# Patient Record
Sex: FEMALE | Race: WHITE | Employment: UNEMPLOYED | URBAN - METROPOLITAN AREA
[De-identification: names, ages, dates, MRNs, and addresses within clinical notes are randomized per-mention and may not be internally consistent; named-entity substitution may affect disease eponyms.]

---

## 2017-06-29 ENCOUNTER — HOSPITAL ENCOUNTER (OUTPATIENT)
Age: 57
Setting detail: OUTPATIENT SURGERY
Discharge: HOME | End: 2017-06-29
Attending: SPECIALIST | Admitting: SPECIALIST
Payer: MEDICARE

## 2017-06-29 ENCOUNTER — ANESTHESIA EVENT (OUTPATIENT)
Dept: ENDOSCOPY | Age: 57
End: 2017-06-29
Payer: MEDICARE

## 2017-06-29 ENCOUNTER — ANESTHESIA (OUTPATIENT)
Dept: ENDOSCOPY | Age: 57
End: 2017-06-29
Payer: MEDICARE

## 2017-06-29 VITALS
RESPIRATION RATE: 18 BRPM | OXYGEN SATURATION: 99 % | SYSTOLIC BLOOD PRESSURE: 125 MMHG | BODY MASS INDEX: 27.83 KG/M2 | DIASTOLIC BLOOD PRESSURE: 73 MMHG | HEIGHT: 70 IN | HEART RATE: 72 BPM | TEMPERATURE: 97.8 F | WEIGHT: 194.38 LBS

## 2017-06-29 PROCEDURE — 74011000250 HC RX REV CODE- 250

## 2017-06-29 PROCEDURE — 76040000019: Performed by: SPECIALIST

## 2017-06-29 PROCEDURE — 76060000031 HC ANESTHESIA FIRST 0.5 HR: Performed by: SPECIALIST

## 2017-06-29 PROCEDURE — 77030009426 HC FCPS BIOP ENDOSC BSC -B: Performed by: SPECIALIST

## 2017-06-29 PROCEDURE — 88305 TISSUE EXAM BY PATHOLOGIST: CPT | Performed by: SPECIALIST

## 2017-06-29 PROCEDURE — 74011250636 HC RX REV CODE- 250/636

## 2017-06-29 RX ORDER — MIDAZOLAM HYDROCHLORIDE 1 MG/ML
.25-1 INJECTION, SOLUTION INTRAMUSCULAR; INTRAVENOUS
Status: DISCONTINUED | OUTPATIENT
Start: 2017-06-29 | End: 2017-06-29 | Stop reason: HOSPADM

## 2017-06-29 RX ORDER — ATROPINE SULFATE 0.1 MG/ML
0.5 INJECTION INTRAVENOUS
Status: DISCONTINUED | OUTPATIENT
Start: 2017-06-29 | End: 2017-06-29 | Stop reason: HOSPADM

## 2017-06-29 RX ORDER — SODIUM CHLORIDE 0.9 % (FLUSH) 0.9 %
5-10 SYRINGE (ML) INJECTION EVERY 8 HOURS
Status: DISCONTINUED | OUTPATIENT
Start: 2017-06-29 | End: 2017-06-29 | Stop reason: HOSPADM

## 2017-06-29 RX ORDER — NALOXONE HYDROCHLORIDE 0.4 MG/ML
0.4 INJECTION, SOLUTION INTRAMUSCULAR; INTRAVENOUS; SUBCUTANEOUS
Status: DISCONTINUED | OUTPATIENT
Start: 2017-06-29 | End: 2017-06-29 | Stop reason: HOSPADM

## 2017-06-29 RX ORDER — LIDOCAINE HYDROCHLORIDE 20 MG/ML
INJECTION, SOLUTION EPIDURAL; INFILTRATION; INTRACAUDAL; PERINEURAL AS NEEDED
Status: DISCONTINUED | OUTPATIENT
Start: 2017-06-29 | End: 2017-06-29 | Stop reason: HOSPADM

## 2017-06-29 RX ORDER — SODIUM CHLORIDE 9 MG/ML
INJECTION, SOLUTION INTRAVENOUS
Status: DISCONTINUED | OUTPATIENT
Start: 2017-06-29 | End: 2017-06-29 | Stop reason: HOSPADM

## 2017-06-29 RX ORDER — FENTANYL CITRATE 50 UG/ML
200 INJECTION, SOLUTION INTRAMUSCULAR; INTRAVENOUS
Status: DISCONTINUED | OUTPATIENT
Start: 2017-06-29 | End: 2017-06-29 | Stop reason: HOSPADM

## 2017-06-29 RX ORDER — SODIUM CHLORIDE 0.9 % (FLUSH) 0.9 %
5-10 SYRINGE (ML) INJECTION AS NEEDED
Status: DISCONTINUED | OUTPATIENT
Start: 2017-06-29 | End: 2017-06-29 | Stop reason: HOSPADM

## 2017-06-29 RX ORDER — FLUMAZENIL 0.1 MG/ML
0.2 INJECTION INTRAVENOUS
Status: DISCONTINUED | OUTPATIENT
Start: 2017-06-29 | End: 2017-06-29 | Stop reason: HOSPADM

## 2017-06-29 RX ORDER — EPINEPHRINE 0.1 MG/ML
1 INJECTION INTRACARDIAC; INTRAVENOUS
Status: DISCONTINUED | OUTPATIENT
Start: 2017-06-29 | End: 2017-06-29 | Stop reason: HOSPADM

## 2017-06-29 RX ORDER — SODIUM CHLORIDE 9 MG/ML
50 INJECTION, SOLUTION INTRAVENOUS CONTINUOUS
Status: DISCONTINUED | OUTPATIENT
Start: 2017-06-29 | End: 2017-06-29 | Stop reason: HOSPADM

## 2017-06-29 RX ORDER — DEXTROMETHORPHAN/PSEUDOEPHED 2.5-7.5/.8
1.2 DROPS ORAL
Status: DISCONTINUED | OUTPATIENT
Start: 2017-06-29 | End: 2017-06-29 | Stop reason: HOSPADM

## 2017-06-29 RX ORDER — PROPOFOL 10 MG/ML
INJECTION, EMULSION INTRAVENOUS AS NEEDED
Status: DISCONTINUED | OUTPATIENT
Start: 2017-06-29 | End: 2017-06-29 | Stop reason: HOSPADM

## 2017-06-29 RX ADMIN — PROPOFOL 50 MG: 10 INJECTION, EMULSION INTRAVENOUS at 15:26

## 2017-06-29 RX ADMIN — PROPOFOL 50 MG: 10 INJECTION, EMULSION INTRAVENOUS at 15:23

## 2017-06-29 RX ADMIN — PROPOFOL 50 MG: 10 INJECTION, EMULSION INTRAVENOUS at 15:20

## 2017-06-29 RX ADMIN — SODIUM CHLORIDE: 9 INJECTION, SOLUTION INTRAVENOUS at 15:15

## 2017-06-29 RX ADMIN — PROPOFOL 50 MG: 10 INJECTION, EMULSION INTRAVENOUS at 15:28

## 2017-06-29 RX ADMIN — PROPOFOL 100 MG: 10 INJECTION, EMULSION INTRAVENOUS at 15:19

## 2017-06-29 RX ADMIN — LIDOCAINE HYDROCHLORIDE 40 MG: 20 INJECTION, SOLUTION EPIDURAL; INFILTRATION; INTRACAUDAL; PERINEURAL at 15:19

## 2017-06-29 NOTE — IP AVS SNAPSHOT
[address] 
[phone] Patient: Everardo Gooden 
MRN: [id number] RRH:0/2/1562 You are allergic to the following Allergen Reactions Naproxen Other (comments) Abilify (Aripiprazole) Unknown (comments) Cymbalta (Duloxetine) Other (comments) Hypotension Lexapro (Escitalopram) Not Reported This Time Mercury (Bulk) Rash Morphine Rash Risperidone Other (comments)  
 seizures Recent Documentation Height Weight Breastfeeding? BMI OB Status Smoking Status 1.778 m 88.2 kg No 27.89 kg/m2 Hysterectomy Light Tobacco Smoker Emergency Contacts Name Discharge Info Relation Home Work Mobile Deacon Lang  Spouse [3] [phone]    
 NShamikaDeacon  Spouse [3] [phone] About your hospitalization You were admitted on:  June 29, 2017 You last received care in the:  [facility] ENDOSCOPY You were discharged on:  June 29, 2017 Unit phone number:  [phone] Why you were hospitalized Your primary diagnosis was:  Not on File Providers Seen During Your Hospitalizations Provider Role Specialty Primary office phone Lori Simental MD Attending Provider Gastroenterology [phone] Your Primary Care Physician (PCP) Primary Care Physician Office Phone Office Fax Carroll Mello [phone] Follow-up Information None Current Discharge Medication List  
  
CONTINUE these medications which have NOT CHANGED Dose & Instructions Dispensing Information Comments Morning Noon Evening Bedtime AMPHETAMINE SALT COMBO 20 mg tablet Generic drug:  dextroamphetamine-amphetamine Your last dose was: Your next dose is:    
   
   
 Dose:  20 mg Take 20 mg by mouth two (2) times a day. Refills:  0  
     
   
   
   
  
 ergocalciferol 50,000 unit capsule Commonly known as:  VITAMIN D2 Your last dose was: Your next dose is:    
   
   
 Dose:  49082 Units Take 1 Cap by mouth every seven (7) days. Indications: VITAMIN D DEFICIENCY Quantity:  12 Cap Refills:  1 Discharge Instructions Shayan Voss 
[id number] 
1960 EGD DISCHARGE INSTRUCTIONS Discomfort: 
Sore throat- throat lozenges or warm salt water gargle 
redness at IV site- apply warm compress to area; if redness or soreness persist- contact your physician Gaseous discomfort- walking, belching will help relieve any discomfort You may not operate a vehicle for 12 hours You may not engage in an occupation involving machinery or appliances for rest of today. You may not drink alcoholic beverages for at least 12 hours Avoid making any critical decisions for at least 24 hour DIET You may resume your regular diet  however -  remember your colon is empty and a heavy meal will produce gas. Avoid these foods:  vegetables, fried / greasy foods, carbonated drinks MEDICATIONS Regarding Aspirin or Nonsteroidal medications specifically, please see below. ACTIVITY You may resume your normal daily activities. Spend the remainder of the day resting -  avoid any strenuous activity. CALL M.D. ANY SIGN OF Increasing pain, nausea, vomiting Abdominal distension (swelling) New increased bleeding (oral or rectal) Fever (chills) Pain in chest area Bloody discharge from nose or mouth Shortness of breath You may not  take any Advil, Aspirin, Ibuprofen, Motrin, Aleve, or Goodys for 10 days, ONLY  Tylenol as needed for pain. Follow-up Instructions: 
 Call Dr. Deepti Mukherjee Results of procedure / biopsy in 10 days, call my office to set up colon appointment. Telephone #  [phone] DISCHARGE SUMMARY from Nurse The following personal items collected during your admission are returned to you: Dental Appliance: Dental Appliances: Uppers, At bedside Vision: Visual Aid: None Hearing Aid:   
Jewelry:   
Clothing:   
Other Valuables:   
Valuables sent to safe:   
 
 
 
 
  
 
 
Discharge Orders None Introducing [facility]! Dear Yordy Aleman: Thank you for requesting a [employer] account. Our records indicate that you have previously registered for a [employer] account but its currently inactive. Please call our [employer] support line at [phone]. Additional Information If you have questions, please visit the Frequently Asked Questions section of the K[employer] website at [email or web address] Remember, [employer] is NOT to be used for urgent needs. For medical emergencies, dial 911. Now available from your iPhone and Android! General Information Please provide this summary of care documentation to your next provider. Patient Signature:  ____________________________________________________________ Date:  ____________________________________________________________  
  
Yordy Aleman Provider Signature:  ____________________________________________________________ Date:  ____________________________________________________________

## 2017-06-29 NOTE — PROGRESS NOTES

## 2017-06-29 NOTE — PROCEDURES
[address]  [address]                 NAME:  Leatha Davila   :   1960   MRN:   [id number]     Date/Time:  2017 3:39 PM    Esophagogastroduodenoscopy (EGD) Procedure Note    :  Marissa Guzmán MD    Referring Provider:  Adam Kingsley NP    Anethesia/Sedation:  MAC anesthesia Propofol    Preoperative diagnosis: ABDOMINAL PAIN, ANEMIA, BIPOLAR DISORDER    Postoperative diagnosis: 1.- Normal EGD    Procedure Details     After infom consent was obtained for the procedure, with all risks and benefits of procedure explained the patient was taken to the endoscopy suite and placed in the left lateral decubitus position. Following sequential administration of sedation as per above, the [id number] gastroscope was inserted into the mouth and advanced under direct vision to proximal jejunum. A careful inspection was made as the gastroscope was withdrawn, including a retroflexed view of the proximal stomach; findings and interventions are described below. Findings:  Esophagus:normal  Stomach:normal   Duodenum/jejunum:normal jejunal mucosa, biopsies done. Therapies:  none    Specimens: jejunal biopsy           EBL: None    Complications:   None; patient tolerated the procedure well.            Impression:    See Postoperative diagnosis above    Recommendations:  -Colon appointment    Discharge disposition:  Home in the company of  when able to ambulate    Marissa Guzmán MD

## 2017-06-29 NOTE — ROUTINE PROCESS
Leatha [facility]  1960  [id number]    Situation:  Verbal report received from: Unique Paredes RN  Procedure: Procedure(s):  ESOPHAGOGASTRODUODENOSCOPY (EGD)  ESOPHAGOGASTRODUODENAL (EGD) BIOPSY    Background:    Preoperative diagnosis: ABDOMINAL PAIN, ANEMIA, BIPOLAR DISORDER  Postoperative diagnosis: 1.- Normal EGD    :  Dr. Juliano Meadows  Assistant(s): Endoscopy Technician-1: Monica Reese  Endoscopy RN-1: Linda Pastor RN    Specimens:   ID Type Source Tests Collected by Time Destination   1 : Jejunum Biopsy Preservative   Marissa Guzmán MD 6/29/2017 1527 Pathology     H. Pylori  no    Assessment:  Intra-procedure medications     Anesthesia gave intra-procedure sedation and medications, see anesthesia flow sheet yes    Intravenous fluids: NS@ KVO     Vital signs stable     Abdominal assessment: round and soft     Recommendation:  Discharge patient per MD order.     Family or Friend  Permission to share finding with family or friend yes

## 2017-06-29 NOTE — DISCHARGE INSTRUCTIONS
Giuliana Dallas  [id number]  1960    EGD DISCHARGE INSTRUCTIONS  Discomfort:  Sore throat- throat lozenges or warm salt water gargle  redness at IV site- apply warm compress to area; if redness or soreness persist- contact your physician  Gaseous discomfort- walking, belching will help relieve any discomfort  You may not operate a vehicle for 12 hours  You may not engage in an occupation involving machinery or appliances for rest of today. You may not drink alcoholic beverages for at least 12 hours  Avoid making any critical decisions for at least 24 hour  DIET  You may resume your regular diet - however -  remember your colon is empty and a heavy meal will produce gas. Avoid these foods:  vegetables, fried / greasy foods, carbonated drinks  MEDICATIONS   Regarding Aspirin or Nonsteroidal medications specifically, please see below. ACTIVITY  You may resume your normal daily activities. Spend the remainder of the day resting -  avoid any strenuous activity. CALL M.D. ANY SIGN OF   Increasing pain, nausea, vomiting  Abdominal distension (swelling)  New increased bleeding (oral or rectal)  Fever (chills)  Pain in chest area  Bloody discharge from nose or mouth  Shortness of breath    You may not  take any Advil, Aspirin, Ibuprofen, Motrin, Aleve, or Goodys for 10 days, ONLY  Tylenol as needed for pain. Follow-up Instructions:   Call Dr. Kevin Taylor  Results of procedure / biopsy in 10 days, call my office to set up colon appointment.   Telephone #  [phone]        DISCHARGE SUMMARY from Nurse    The following personal items collected during your admission are returned to you:   Dental Appliance: Dental Appliances: Uppers, At bedside  Vision: Visual Aid: None  Hearing Aid:    Jewelry:    Clothing:    Other Valuables:    Valuables sent to safe:

## 2017-06-29 NOTE — ANESTHESIA PREPROCEDURE EVALUATION
Anesthetic History   No history of anesthetic complications            Review of Systems / Medical History  Patient summary reviewed, nursing notes reviewed and pertinent labs reviewed    Pulmonary  Within defined limits                 Neuro/Psych         TIA     Cardiovascular  Within defined limits                     GI/Hepatic/Renal           Liver disease     Endo/Other      Hypothyroidism: well controlled       Other Findings              Physical Exam    Airway  Mallampati: II  TM Distance: > 6 cm  Neck ROM: normal range of motion   Mouth opening: Normal     Cardiovascular  Regular rate and rhythm,  S1 and S2 normal,  no murmur, click, rub, or gallop             Dental  No notable dental hx       Pulmonary  Breath sounds clear to auscultation               Abdominal  GI exam deferred       Other Findings            Anesthetic Plan    ASA: 2  Anesthesia type: MAC          Induction: Intravenous  Anesthetic plan and risks discussed with: Patient

## 2017-06-30 NOTE — ANESTHESIA POSTPROCEDURE EVALUATION
Post-Anesthesia Evaluation and Assessment    Patient: Stephen Cranker MRN: [id number]  SSN: [id number]    YOB: 1960  Age: 61 y.o. Sex: female       Cardiovascular Function/Vital Signs  Visit Vitals    /73    Pulse 72    Temp 36.6 °C (97.8 °F)    Resp 18    Ht 5' 10\" (1.778 m)    Wt 88.2 kg (194 lb 6 oz)    SpO2 99%    Breastfeeding No    BMI 27.89 kg/m2       Patient is status post MAC anesthesia for Procedure(s):  ESOPHAGOGASTRODUODENOSCOPY (EGD)  ESOPHAGOGASTRODUODENAL (EGD) BIOPSY. Nausea/Vomiting: None    Postoperative hydration reviewed and adequate. Pain:  Pain Scale 1: Numeric (0 - 10) (06/29/17 1554)  Pain Intensity 1: 0 (06/29/17 1554)   Managed    Neurological Status: At baseline    Mental Status and Level of Consciousness: Arousable    Pulmonary Status:   O2 Device: Room air (06/29/17 1554)   Adequate oxygenation and airway patent    Complications related to anesthesia: None    Post-anesthesia assessment completed.  No concerns    Signed By: Mady Mendez MD     June 30, 2017

## 2017-09-18 ENCOUNTER — HOSPITAL ENCOUNTER (INPATIENT)
Age: 57
LOS: 1 days | Discharge: HOME | DRG: 882 | End: 2017-09-19
Attending: EMERGENCY MEDICINE | Admitting: PSYCHIATRY & NEUROLOGY
Payer: MEDICARE

## 2017-09-18 DIAGNOSIS — F31.4 BIPOLAR AFFECTIVE DISORDER, DEPRESSED, SEVERE (HCC): Primary | ICD-10-CM

## 2017-09-18 PROBLEM — F32.A DEPRESSION: Status: ACTIVE | Noted: 2017-09-18

## 2017-09-18 LAB
ALBUMIN SERPL-MCNC: 3 G/DL (ref 3.5–5)
ALBUMIN/GLOB SERPL: 0.9 {RATIO} (ref 1.1–2.2)
ALP SERPL-CCNC: 155 U/L (ref 45–117)
ALT SERPL-CCNC: 221 U/L (ref 12–78)
AMPHET UR QL SCN: NEGATIVE
ANION GAP SERPL CALC-SCNC: 8 MMOL/L (ref 5–15)
APAP SERPL-MCNC: <2 UG/ML (ref 10–30)
APPEARANCE UR: CLEAR
AST SERPL-CCNC: 99 U/L (ref 15–37)
BARBITURATES UR QL SCN: NEGATIVE
BASOPHILS # BLD: 0 K/UL (ref 0–0.1)
BASOPHILS NFR BLD: 0 % (ref 0–1)
BENZODIAZ UR QL: NEGATIVE
BILIRUB SERPL-MCNC: 0.6 MG/DL (ref 0.2–1)
BILIRUB UR QL: NEGATIVE
BUN SERPL-MCNC: 17 MG/DL (ref 6–20)
BUN/CREAT SERPL: 22 (ref 12–20)
CALCIUM SERPL-MCNC: 7.9 MG/DL (ref 8.5–10.1)
CANNABINOIDS UR QL SCN: NEGATIVE
CHLORIDE SERPL-SCNC: 112 MMOL/L (ref 97–108)
CO2 SERPL-SCNC: 22 MMOL/L (ref 21–32)
COCAINE UR QL SCN: NEGATIVE
COLOR UR: NORMAL
CREAT SERPL-MCNC: 0.79 MG/DL (ref 0.55–1.02)
DIFFERENTIAL METHOD BLD: ABNORMAL
DRUG SCRN COMMENT,DRGCM: NORMAL
EOSINOPHIL # BLD: 0.1 K/UL (ref 0–0.4)
EOSINOPHIL NFR BLD: 1 % (ref 0–7)
ERYTHROCYTE [DISTWIDTH] IN BLOOD BY AUTOMATED COUNT: 18.8 % (ref 11.5–14.5)
ETHANOL SERPL-MCNC: <10 MG/DL
GLOBULIN SER CALC-MCNC: 3.2 G/DL (ref 2–4)
GLUCOSE SERPL-MCNC: 161 MG/DL (ref 65–100)
GLUCOSE UR STRIP.AUTO-MCNC: NEGATIVE MG/DL
HCT VFR BLD AUTO: 28.7 % (ref 35–47)
HGB BLD-MCNC: 8.2 G/DL (ref 11.5–16)
HGB UR QL STRIP: NEGATIVE
KETONES UR QL STRIP.AUTO: NEGATIVE MG/DL
LEUKOCYTE ESTERASE UR QL STRIP.AUTO: NEGATIVE
LYMPHOCYTES # BLD: 2.2 K/UL (ref 0.8–3.5)
LYMPHOCYTES NFR BLD: 33 % (ref 12–49)
MCH RBC QN AUTO: 21.3 PG (ref 26–34)
MCHC RBC AUTO-ENTMCNC: 28.6 G/DL (ref 30–36.5)
MCV RBC AUTO: 74.5 FL (ref 80–99)
METHADONE UR QL: NEGATIVE
MONOCYTES # BLD: 0.7 K/UL (ref 0–1)
MONOCYTES NFR BLD: 10 % (ref 5–13)
NEUTS SEG # BLD: 3.8 K/UL (ref 1.8–8)
NEUTS SEG NFR BLD: 56 % (ref 32–75)
NITRITE UR QL STRIP.AUTO: NEGATIVE
OPIATES UR QL: NEGATIVE
PCP UR QL: NEGATIVE
PH UR STRIP: 5.5 [PH] (ref 5–8)
PLATELET # BLD AUTO: 568 K/UL (ref 150–400)
PLATELET COMMENTS,PCOM: ABNORMAL
POTASSIUM SERPL-SCNC: 3.3 MMOL/L (ref 3.5–5.1)
PROT SERPL-MCNC: 6.2 G/DL (ref 6.4–8.2)
PROT UR STRIP-MCNC: NEGATIVE MG/DL
RBC # BLD AUTO: 3.85 M/UL (ref 3.8–5.2)
RBC MORPH BLD: ABNORMAL
SALICYLATES SERPL-MCNC: <1.7 MG/DL (ref 2.8–20)
SODIUM SERPL-SCNC: 142 MMOL/L (ref 136–145)
SP GR UR REFRACTOMETRY: 1.02 (ref 1–1.03)
TSH SERPL DL<=0.05 MIU/L-ACNC: 1.89 UIU/ML (ref 0.36–3.74)
UROBILINOGEN UR QL STRIP.AUTO: 1 EU/DL (ref 0.2–1)
WBC # BLD AUTO: 6.8 K/UL (ref 3.6–11)

## 2017-09-18 PROCEDURE — 80307 DRUG TEST PRSMV CHEM ANLYZR: CPT | Performed by: EMERGENCY MEDICINE

## 2017-09-18 PROCEDURE — 74011250637 HC RX REV CODE- 250/637: Performed by: PSYCHIATRY & NEUROLOGY

## 2017-09-18 PROCEDURE — 90791 PSYCH DIAGNOSTIC EVALUATION: CPT

## 2017-09-18 PROCEDURE — 84443 ASSAY THYROID STIM HORMONE: CPT | Performed by: PSYCHIATRY & NEUROLOGY

## 2017-09-18 PROCEDURE — 81003 URINALYSIS AUTO W/O SCOPE: CPT | Performed by: EMERGENCY MEDICINE

## 2017-09-18 PROCEDURE — 36415 COLL VENOUS BLD VENIPUNCTURE: CPT | Performed by: EMERGENCY MEDICINE

## 2017-09-18 PROCEDURE — 80053 COMPREHEN METABOLIC PANEL: CPT | Performed by: EMERGENCY MEDICINE

## 2017-09-18 PROCEDURE — 74011250637 HC RX REV CODE- 250/637: Performed by: EMERGENCY MEDICINE

## 2017-09-18 PROCEDURE — 65220000003 HC RM SEMIPRIVATE PSYCH

## 2017-09-18 PROCEDURE — 99283 EMERGENCY DEPT VISIT LOW MDM: CPT

## 2017-09-18 PROCEDURE — 85025 COMPLETE CBC W/AUTO DIFF WBC: CPT | Performed by: EMERGENCY MEDICINE

## 2017-09-18 RX ORDER — POTASSIUM CHLORIDE 750 MG/1
40 TABLET, FILM COATED, EXTENDED RELEASE ORAL
Status: COMPLETED | OUTPATIENT
Start: 2017-09-18 | End: 2017-09-18

## 2017-09-18 RX ORDER — ASENAPINE 10 MG/1
10 TABLET SUBLINGUAL EVERY EVENING
COMMUNITY

## 2017-09-18 RX ORDER — LORAZEPAM 1 MG/1
1 TABLET ORAL
Status: DISCONTINUED | OUTPATIENT
Start: 2017-09-18 | End: 2017-09-19 | Stop reason: HOSPADM

## 2017-09-18 RX ORDER — LORAZEPAM 1 MG/1
1-2 TABLET ORAL
COMMUNITY

## 2017-09-18 RX ORDER — DEXTROAMPHETAMINE SACCHARATE, AMPHETAMINE ASPARTATE, DEXTROAMPHETAMINE SULFATE AND AMPHETAMINE SULFATE 5; 5; 5; 5 MG/1; MG/1; MG/1; MG/1
10 TABLET ORAL
COMMUNITY
End: 2017-09-19

## 2017-09-18 RX ORDER — SWAB
1 SWAB, NON-MEDICATED MISCELLANEOUS 2 TIMES DAILY
COMMUNITY

## 2017-09-18 RX ORDER — BENZTROPINE MESYLATE 2 MG/1
2 TABLET ORAL
Status: DISCONTINUED | OUTPATIENT
Start: 2017-09-18 | End: 2017-09-19 | Stop reason: HOSPADM

## 2017-09-18 RX ORDER — VITAMIN E 268 MG
400 CAPSULE ORAL DAILY
COMMUNITY

## 2017-09-18 RX ORDER — ZOLPIDEM TARTRATE 5 MG/1
5 TABLET ORAL
Status: DISCONTINUED | OUTPATIENT
Start: 2017-09-18 | End: 2017-09-19 | Stop reason: HOSPADM

## 2017-09-18 RX ORDER — ZOLPIDEM TARTRATE 10 MG/1
10 TABLET ORAL
Status: DISCONTINUED | OUTPATIENT
Start: 2017-09-18 | End: 2017-09-18 | Stop reason: DRUGHIGH

## 2017-09-18 RX ORDER — ADHESIVE BANDAGE
30 BANDAGE TOPICAL DAILY PRN
Status: DISCONTINUED | OUTPATIENT
Start: 2017-09-18 | End: 2017-09-19 | Stop reason: HOSPADM

## 2017-09-18 RX ORDER — ERGOCALCIFEROL 1.25 MG/1
50000 CAPSULE ORAL
COMMUNITY

## 2017-09-18 RX ORDER — OLANZAPINE 5 MG/1
5 TABLET ORAL
Status: DISCONTINUED | OUTPATIENT
Start: 2017-09-18 | End: 2017-09-19 | Stop reason: HOSPADM

## 2017-09-18 RX ORDER — IBUPROFEN 200 MG
1 TABLET ORAL
Status: DISCONTINUED | OUTPATIENT
Start: 2017-09-18 | End: 2017-09-19 | Stop reason: HOSPADM

## 2017-09-18 RX ORDER — BENZTROPINE MESYLATE 1 MG/ML
2 INJECTION INTRAMUSCULAR; INTRAVENOUS
Status: DISCONTINUED | OUTPATIENT
Start: 2017-09-18 | End: 2017-09-19 | Stop reason: HOSPADM

## 2017-09-18 RX ORDER — DEXTROAMPHETAMINE SACCHARATE, AMPHETAMINE ASPARTATE, DEXTROAMPHETAMINE SULFATE AND AMPHETAMINE SULFATE 5; 5; 5; 5 MG/1; MG/1; MG/1; MG/1
20 TABLET ORAL DAILY
COMMUNITY
End: 2017-09-19

## 2017-09-18 RX ORDER — LORAZEPAM 2 MG/ML
2 INJECTION INTRAMUSCULAR
Status: DISCONTINUED | OUTPATIENT
Start: 2017-09-18 | End: 2017-09-19 | Stop reason: HOSPADM

## 2017-09-18 RX ORDER — CYANOCOBALAMIN 1000 UG/ML
1000 INJECTION, SOLUTION INTRAMUSCULAR; SUBCUTANEOUS
COMMUNITY

## 2017-09-18 RX ORDER — ACETAMINOPHEN 325 MG/1
650 TABLET ORAL
Status: DISCONTINUED | OUTPATIENT
Start: 2017-09-18 | End: 2017-09-19 | Stop reason: HOSPADM

## 2017-09-18 RX ADMIN — POTASSIUM CHLORIDE 40 MEQ: 750 TABLET, FILM COATED, EXTENDED RELEASE ORAL at 19:26

## 2017-09-18 RX ADMIN — ZOLPIDEM TARTRATE 5 MG: 5 TABLET ORAL at 22:04

## 2017-09-18 NOTE — PROGRESS NOTES
Admission Medication Reconciliation:    Information obtained from: Patient    Significant PMH/Disease States:   Past Medical History:   Diagnosis Date    Anemia NEC     Anxiety disorder     Depression     Hypothyroid     Liver disease     past liver failure    Lyme disease     Nneka ([facility] [address]     Other ill-defined conditions     tramatic brain injury    Psychiatric disorder     depression    Stroke ([facility] [address] 1990s    TIA       Chief Complaint for this Admission:    Chief Complaint   Patient presents with    Mental Health Problem         Allergies:  Naproxen; Abilify [aripiprazole]; Cymbalta [duloxetine]; Lexapro [escitalopram]; Mercury (bulk); Morphine; and Risperidone    Prior to Admission Medications:   Prior to Admission Medications   Prescriptions Last Dose Informant Patient Reported? Taking? IRON PS CPLX/ASCORBIC ACID (IRON PS COMPLEX-ASCORBIC ACID PO)   Yes Yes   Sig: Take 1 Tab by mouth daily. KRILL OIL PO   Yes Yes   Sig: Take 1 Cap by mouth daily. LORazepam (ATIVAN) 1 mg tablet   Yes Yes   Sig: Take 1-2 mg by mouth nightly. asenapine (SAPHRIS) 10 mg subl   Yes Yes   Sig: 10 mg by SubLINGual route every evening. cyanocobalamin (VITAMIN B12) 1,000 mcg/mL injection 2017  Yes Yes   Si,000 mcg by IntraMUSCular route Every Saturday. dextroamphetamine-amphetamine (ADDERALL) 20 mg tablet   Yes Yes   Sig: Take 20 mg by mouth daily. dextroamphetamine-amphetamine (ADDERALL) 20 mg tablet   Yes Yes   Sig: Take 10 mg by mouth daily (after lunch). @ 1600         ergocalciferol (ERGOCALCIFEROL) 50,000 unit capsule 2017  Yes Yes   Sig: Take 50,000 Units by mouth Every Friday. prenatal vit-iron fumarate-fa (PRENATAL PLUS WITH IRON) 28 mg iron- 800 mcg tab   Yes Yes   Sig: Take 1 Tab by mouth two (2) times a day. vitamin E (AQUA GEMS) 400 unit capsule   Yes Yes   Sig: Take 400 Units by mouth daily.       Facility-Administered Medications: None         Comments/Recommendations: Spoke with patient. She reports that she hasn't been taking her medications for over the last week or so. She says when she gets depressed that she often stops taking medications.   Changes made to previous list on file as follows:    -Updated dose of Adderall  -Changed admin day of vitamin D2 to Friday's  -Added all other medications as shown above    Donell Jean, ChrisD

## 2017-09-18 NOTE — ED TRIAGE NOTES
Pt. presents tearful with family complaining of depression and being suicidal.  Pt. Has SI but denies plan. \"I can't, I promised my daughter\". Pt. has spouse and daughter with her.

## 2017-09-18 NOTE — ED PROVIDER NOTES
HPI Comments: 61 y.o. female with past medical history significant for depression who presents from home via private vehicle with chief complaint of suicidal ideations, depression, and HA. Pt reports for the past few days she has had HA and trouble remembering things. Over this time she has had an increasingly depressed mood and then today she began to have thoughts of wanting to die. She reports that she would take an overdose of her medications to end her life. She also reports that she has not been sleeping well the past 4 nights. Pt denies homicidal ideations, auditory or visual hallucinations, fever, chills, vision changes, CP, SOB, nausea, vomiting, abdominal pain. There are no other acute medical concerns at this time. PCP: Ricarda Mallory NP  Note written by tabitha Herndon, as dictated by Clarisa Dyer MD 6:29 PM         The history is provided by the patient. Past Medical History:   Diagnosis Date    Anemia NEC     Anxiety disorder     Depression     Hypothyroid     Liver disease     past liver failure    Lyme disease     Nneka ([facility] [address]     Other ill-defined conditions     tramatic brain injury    Psychiatric disorder     depression    Stroke ([facility] [address] 1990s    TIA       Past Surgical History:   Procedure Laterality Date    HX GASTRIC BYPASS  96    By Dr Chelsy Pulido HX GYN      hysterectomy/    HX ORTHOPAEDIC      back         Family History:   Problem Relation Age of Onset    Heart Attack Mother     Suicide Brother     COPD Father     Diabetes Father     Hypertension Father        Social History     Social History    Marital status:      Spouse name: N/A    Number of children: N/A    Years of education: N/A     Occupational History    Not on file.      Social History Main Topics    Smoking status: Light Tobacco Smoker    Smokeless tobacco: Former User     Quit date: 2017    Alcohol use No    Drug use: Yes     Special: Marijuana Comment: used a week ago    Sexual activity: Yes     Partners: Male     Birth control/ protection: None     Other Topics Concern    Not on file     Social History Narrative         ALLERGIES: Naproxen; Abilify [aripiprazole]; Cymbalta [duloxetine]; Lexapro [escitalopram]; Mercury (bulk); Morphine; and Risperidone    Review of Systems   Constitutional: Negative for chills, diaphoresis and fever. HENT: Negative for congestion, postnasal drip, rhinorrhea and sore throat. Eyes: Negative for photophobia, discharge, redness and visual disturbance. Respiratory: Negative for cough, chest tightness, shortness of breath and wheezing. Cardiovascular: Negative for chest pain, palpitations and leg swelling. Gastrointestinal: Negative for abdominal distention, abdominal pain, blood in stool, constipation, diarrhea, nausea and vomiting. Genitourinary: Negative for difficulty urinating, dysuria, frequency, hematuria and urgency. Musculoskeletal: Negative for arthralgias, back pain, joint swelling and myalgias. Skin: Negative for color change and rash. Neurological: Positive for headaches. Negative for dizziness, speech difficulty, weakness, light-headedness and numbness. Psychiatric/Behavioral: Positive for sleep disturbance and suicidal ideas. Negative for confusion and hallucinations. The patient is not nervous/anxious. All other systems reviewed and are negative. Vitals:    09/18/17 1447   BP: 120/70   Pulse: 80   Resp: 18   Temp: 97.9 °F (36.6 °C)   SpO2: 97%   Weight: 91.8 kg (202 lb 6.4 oz)   Height: 5' 10\" (1.778 m)            Physical Exam   Constitutional: She is oriented to person, place, and time. She appears well-developed and well-nourished. No distress. HENT:   Head: Normocephalic and atraumatic.    Right Ear: External ear normal.   Left Ear: External ear normal.   Nose: Nose normal.   Mouth/Throat: Oropharynx is clear and moist.   Eyes: Conjunctivae and EOM are normal. Pupils are equal, round, and reactive to light. No scleral icterus. Neck: Normal range of motion. Neck supple. No JVD present. No tracheal deviation present. No thyromegaly present. Cardiovascular: Normal rate, regular rhythm and normal heart sounds. Exam reveals no gallop and no friction rub. No murmur heard. Pulmonary/Chest: Effort normal and breath sounds normal. No respiratory distress. She has no wheezes. She has no rales. She exhibits no tenderness. Abdominal: Soft. Bowel sounds are normal. She exhibits no distension and no mass. There is no tenderness. There is no rebound and no guarding. Musculoskeletal: Normal range of motion. She exhibits no edema or tenderness. Lymphadenopathy:     She has no cervical adenopathy. Neurological: She is alert and oriented to person, place, and time. She has normal strength. She displays no atrophy and no tremor. No cranial nerve deficit. She exhibits normal muscle tone. Coordination and gait normal.   Skin: Skin is warm and dry. No rash noted. She is not diaphoretic. No erythema. Psychiatric:   Flat affect, poor eye contact, does not engage. Nursing note and vitals reviewed. Note written by tabitha Israel, as dictated by Irma Narvaez MD 6:39 PM      MDM  Number of Diagnoses or Management Options  Bipolar affective disorder, depressed, severe [facility]):   Diagnosis management comments: DAVE  44-year-old female presenting to the emergency department for evaluation for mental health disorder. Plan of care will be behavioral health consultation and we'll treat accordingly. ED Course       Procedures    5:39 PM  Spoke with Rajiv Rendon counselor, who agrees to see and evaluate the patient. 6:42 PM  The patient is medically cleared to be admitted to the psychiatric unit. She is hypokalemic and will require potassium supplement in the form of Dorothea Dur 40 mEq daily for the next 5 days with a repeat potassium check.  In addition she is anemic and should have further evaluation for the cause of her anemia.

## 2017-09-18 NOTE — IP AVS SNAPSHOT
[address] [facility] 
[phone] Patient: Kay Winslow 
MRN: [id number] SOF:8/8/6240 Current Discharge Medication List  
  
CONTINUE these medications which have CHANGED Dose & Instructions Dispensing Information Comments Morning Noon Evening Bedtime  
 ergocalciferol 50,000 unit capsule Commonly known as:  ERGOCALCIFEROL What changed:  Another medication with the same name was removed. Continue taking this medication, and follow the directions you see here. Your last dose was: Your next dose is:    
   
   
 Dose:  57099 Units Take 50,000 Units by mouth Every Friday. Refills:  0 CONTINUE these medications which have NOT CHANGED Dose & Instructions Dispensing Information Comments Morning Noon Evening Bedtime ATIVAN 1 mg tablet Generic drug:  LORazepam  
   
Your last dose was: Your next dose is:    
   
   
 Dose:  1-2 mg Take 1-2 mg by mouth nightly. Refills:  0  
     
   
   
   
  
 cyanocobalamin 1,000 mcg/mL injection Commonly known as:  VITAMIN B12 Your last dose was: Your next dose is:    
   
   
 Dose:  1000 mcg  
1,000 mcg by IntraMUSCular route Every Saturday. Refills:  0 IRON PS COMPLEX-ASCORBIC ACID PO Your last dose was: Your next dose is:    
   
   
 Dose:  1 Tab Take 1 Tab by mouth daily. Refills:  0 KRILL OIL PO Your last dose was: Your next dose is:    
   
   
 Dose:  1 Cap Take 1 Cap by mouth daily. Refills:  0  
     
   
   
   
  
 prenatal vit-iron fumarate-fa 28 mg iron- 800 mcg Tab Commonly known as:  PRENATAL PLUS with IRON Your last dose was: Your next dose is:    
   
   
 Dose:  1 Tab Take 1 Tab by mouth two (2) times a day. Refills:  0 SAPHRIS 10 mg Subl Generic drug:  asenapine Your last dose was: Your next dose is:    
   
   
 Dose:  10 mg  
10 mg by SubLINGual route every evening. Refills:  0  
     
   
   
   
  
 vitamin E 400 unit capsule Commonly known as:  Marielos [address] Your last dose was: Your next dose is:    
   
   
 Dose:  400 Units Take 400 Units by mouth daily. Refills:  0 STOP taking these medications ADDERALL 20 mg tablet Generic drug:  dextroamphetamine-amphetamine AMPHETAMINE SALT COMBO 20 mg tablet Generic drug:  dextroamphetamine-amphetamine

## 2017-09-18 NOTE — ED NOTES
TRANSFER - OUT REPORT:    Verbal report given to Brent(name) on Adilia Cofranki  being transferred to 727(unit) for routine progression of care       Report consisted of patients Situation, Background, Assessment and   Recommendations(SBAR). Information from the following report(s) SBAR, Kardex, ED Summary, [facility] and Recent Results was reviewed with the receiving nurse. Lines:       Opportunity for questions and clarification was provided.       Patient transported with:   AMY

## 2017-09-18 NOTE — ROUTINE PROCESS
TRANSFER - IN REPORT:    Verbal report received from Ovi Jiménez on Shiraz Weiner  being received from ED for routine progression of care      Report consisted of patients Situation, Background, Assessment and   Recommendations(SBAR). Information from the following report(s) SBAR, Kardex, ED Summary, [facility] and Recent Results was reviewed with the receiving nurse. Opportunity for questions and clarification was provided. Assessment to be completed upon patients arrival to unit and care assumed.

## 2017-09-18 NOTE — IP AVS SNAPSHOT
[address] 
[phone] Patient: Ayesha Carcamo 
MRN: [id number] BMP:[id number] You are allergic to the following Allergen Reactions Naproxen Other (comments) Abilify (Aripiprazole) Unknown (comments) Cymbalta (Duloxetine) Other (comments) Hypotension Lexapro (Escitalopram) Not Reported This Time Mercury (Bulk) Rash Morphine Rash Risperidone Other (comments)  
 seizures Recent Documentation Height Weight Breastfeeding? BMI OB Status Smoking Status 1.778 m 91.8 kg No 29.04 kg/m2 Hysterectomy Light Tobacco Smoker Emergency Contacts Name Discharge Info Relation Home Work Mobile Deacon Lang DISCHARGE CAREGIVER [3] Spouse [3] [phone] RickeyMeaghan DISCHARGE CAREGIVER [3] Daughter [21] [phone] About your hospitalization You were admitted on:  September 18, 2017 You last received care in the:  [address] You were discharged on:  September 19, 2017 Unit phone number:  [phone] Why you were hospitalized Your primary diagnosis was:  Depression Your diagnoses also included:  Factitious Disorder Providers Seen During Your Hospitalizations Provider Role Specialty Primary office phone Compa Nelson MD Attending Provider Emergency Medicine [phone] Suzanne Scott MD Attending Provider Psychiatry [phone] Your Primary Care Physician (PCP) Primary Care Physician Office Phone Office Fax [place] [phone] Follow-up Information Follow up With Details Comments Contact Info Terri Marinelli NP Schedule an appointment as soon as possible for a visit  [address] [place], [place] [address]  
[phone] List of therapist given to patient Current Discharge Medication List  
  
 CONTINUE these medications which have CHANGED Dose & Instructions Dispensing Information Comments Morning Noon Evening Bedtime  
 ergocalciferol 50,000 unit capsule Commonly known as:  ERGOCALCIFEROL What changed:  Another medication with the same name was removed. Continue taking this medication, and follow the directions you see here. Your last dose was: Your next dose is:    
   
   
 Dose:  56522 Units Take 50,000 Units by mouth Every Friday. Refills:  0 CONTINUE these medications which have NOT CHANGED Dose & Instructions Dispensing Information Comments Morning Noon Evening Bedtime ATIVAN 1 mg tablet Generic drug:  LORazepam  
   
Your last dose was: Your next dose is:    
   
   
 Dose:  1-2 mg Take 1-2 mg by mouth nightly. Refills:  0  
     
   
   
   
  
 cyanocobalamin 1,000 mcg/mL injection Commonly known as:  VITAMIN B12 Your last dose was: Your next dose is:    
   
   
 Dose:  1000 mcg  
1,000 mcg by IntraMUSCular route Every Saturday. Refills:  0 IRON PS COMPLEX-ASCORBIC ACID PO Your last dose was: Your next dose is:    
   
   
 Dose:  1 Tab Take 1 Tab by mouth daily. Refills:  0 KRILL OIL PO Your last dose was: Your next dose is:    
   
   
 Dose:  1 Cap Take 1 Cap by mouth daily. Refills:  0  
     
   
   
   
  
 prenatal vit-iron fumarate-fa 28 mg iron- 800 mcg Tab Commonly known as:  PRENATAL PLUS with IRON Your last dose was: Your next dose is:    
   
   
 Dose:  1 Tab Take 1 Tab by mouth two (2) times a day. Refills:  0 SAPHRIS 10 mg Subl Generic drug:  asenapine Your last dose was: Your next dose is:    
   
   
 Dose:  10 mg  
10 mg by SubLINGual route every evening. Refills:  0  
     
   
   
   
  
 vitamin E 400 unit capsule Commonly known as:  [address] Your last dose was: Your next dose is:    
   
   
 Dose:  400 Units Take 400 Units by mouth daily. Refills:  0 STOP taking these medications ADDERALL 20 mg tablet Generic drug:  dextroamphetamine-amphetamine AMPHETAMINE SALT COMBO 20 mg tablet Generic drug:  dextroamphetamine-amphetamine Discharge Instructions DISCHARGE SUMMARY 
 
Deniz Galeano 
: 1960 MRN: [id number] The patient Paulette Dietrich exhibits the ability to control behavior in a less restrictive environment. Patient's level of functioning is improving. No assaultive/destructive behavior has been observed for the past 24 hours. No suicidal/homicidal threat or behavior has been observed for the past 24 hours. There is no evidence of serious medication side effects. Patient has not been in physical or protective restraints for at least the past 24 hours. If weapons involved, how are they secured? No weapons involved Is patient aware of and in agreement with discharge plan? Patient is aware of discharge and is in agreement Arrangements for medication:no prescriptions given to patient. Copy of discharge instructions to  provider?:  No, she will follow up with her PCP no changes in medications , referred to a therapist  
 
Arrangements for transportation home:  Family to  Keep all follow up appointments as scheduled, continue to take prescribed medications per physician instructions. Mental health crisis number:  716 or your local mental health crisis line number at [phone] DISCHARGE SUMMARY from Nurse The following personal items are in your possession at time of discharge: 
 
Dental Appliances: None Visual Aid: Glasses, With patient Home Medications: None Jewelry: None Clothing: Shirt, Socks, Undergarments, Pants Other Valuables: Eyeglasses, Money (comment), Personal toiletries Personal Items Sent to Safe: Money, ID 
 
 
 
 
PATIENT INSTRUCTIONS: 
 
 
What to do at Home: 
Recommended activity: Activity as tolerated, If you experience any of the following symptoms increased hopelessness or thoughts of self harm , please follow up with [id number] . *  Please give a list of your current medications to your Primary Care Provider. *  Please update this list whenever your medications are discontinued, doses are 
    changed, or new medications (including over-the-counter products) are added. *  Please carry medication information at all times in case of emergency situations. These are general instructions for a healthy lifestyle: No smoking/ No tobacco products/ Avoid exposure to second hand smoke Surgeon General's Warning:  Quitting smoking now greatly reduces serious risk to your health. Obesity, smoking, and sedentary lifestyle greatly increases your risk for illness A healthy diet, regular physical exercise & weight monitoring are important for maintaining a healthy lifestyle You may be retaining fluid if you have a history of heart failure or if you experience any of the following symptoms:  Weight gain of 3 pounds or more overnight or 5 pounds in a week, increased swelling in our hands or feet or shortness of breath while lying flat in bed. Please call your doctor as soon as you notice any of these symptoms; do not wait until your next office visit. Recognize signs and symptoms of STROKE: 
 
F-face looks uneven A-arms unable to move or move unevenly S-speech slurred or non-existent T-time-call 911 as soon as signs and symptoms begin-DO NOT go Back to bed or wait to see if you get better-TIME IS BRAIN. Warning Signs of HEART ATTACK Call 911 if you have these symptoms: 
? Chest discomfort.  Most heart attacks involve discomfort in the center of the chest that lasts more than a few minutes, or that goes away and comes back. It can feel like uncomfortable pressure, squeezing, fullness, or pain. ? Discomfort in other areas of the upper body. Symptoms can include pain or discomfort in one or both arms, the back, neck, jaw, or stomach. ? Shortness of breath with or without chest discomfort. ? Other signs may include breaking out in a cold sweat, nausea, or lightheadedness. Don't wait more than five minutes to call [address]! Fast action can save your life. Calling 911 is almost always the fastest way to get lifesaving treatment. Emergency Medical Services staff can begin treatment when they arrive  up to an hour sooner than if someone gets to the hospital by car. The discharge information has been reviewed with the patient. The patient verbalized understanding. Discharge medications reviewed with the patient and appropriate educational materials and side effects teaching were provided. Discharge Orders None "[employer] Announcement We are excited to announce that we are making your provider's discharge notes available to you in "[employer]. You will see these notes when they are completed and signed by the physician that discharged you from your recent hospital stay. If you have any questions or concerns about any information you see in "[employer] please call the Health Information Department where you were seen or reach out to your Primary Care Provider for more information about your plan of care. Introducing \[facility]\"" & [facility] SERVICES! Dear Flower Benitez: Thank you for requesting a "[employer] account. Our records indicate that you have previously registered for a "[employer] account but its currently inactive. Please call our "[employer] support line at [phone]. Additional Information If you have questions, please visit the Frequently Asked Questions section of the "[employer] website at [email or web address] Remember, "[employer] is NOT to be used for urgent needs.  For medical emergencies, dial 911. Now available from your iPhone and Android! General Information Please provide this summary of care documentation to your next provider. Patient Signature:  ____________________________________________________________ Date:  ____________________________________________________________  
  
Edrie Gunnels Provider Signature:  ____________________________________________________________ Date:  ____________________________________________________________
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
## 2017-09-19 VITALS
DIASTOLIC BLOOD PRESSURE: 81 MMHG | SYSTOLIC BLOOD PRESSURE: 124 MMHG | TEMPERATURE: 98.2 F | HEIGHT: 70 IN | HEART RATE: 100 BPM | RESPIRATION RATE: 18 BRPM | OXYGEN SATURATION: 97 % | WEIGHT: 202.4 LBS | BODY MASS INDEX: 28.98 KG/M2

## 2017-09-19 PROBLEM — F68.10 FACTITIOUS DISORDER: Status: ACTIVE | Noted: 2017-09-19

## 2017-09-19 LAB
CHOLEST SERPL-MCNC: 88 MG/DL
GLUCOSE P FAST SERPL-MCNC: 83 MG/DL (ref 65–100)
HDLC SERPL-MCNC: 40 MG/DL
HDLC SERPL: 2.2 {RATIO} (ref 0–5)
LDLC SERPL CALC-MCNC: 36 MG/DL (ref 0–100)
LIPID PROFILE,FLP: NORMAL
TRIGL SERPL-MCNC: 60 MG/DL (ref ?–150)
VLDLC SERPL CALC-MCNC: 12 MG/DL

## 2017-09-19 PROCEDURE — 82947 ASSAY GLUCOSE BLOOD QUANT: CPT | Performed by: PSYCHIATRY & NEUROLOGY

## 2017-09-19 PROCEDURE — 80061 LIPID PANEL: CPT | Performed by: PSYCHIATRY & NEUROLOGY

## 2017-09-19 PROCEDURE — 36415 COLL VENOUS BLD VENIPUNCTURE: CPT | Performed by: PSYCHIATRY & NEUROLOGY

## 2017-09-19 PROCEDURE — 74011250637 HC RX REV CODE- 250/637: Performed by: PSYCHIATRY & NEUROLOGY

## 2017-09-19 RX ADMIN — ACETAMINOPHEN 650 MG: 325 TABLET, FILM COATED ORAL at 05:00
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
## 2017-09-19 NOTE — BH NOTES
Behavioral Health Transition Record to Provider    Patient Name: Tuyet Simons  YOB: 1960  Medical Record Number: [id number]  Date of Admission: 9/18/2017  Date of Discharge: 9/19/2017     Attending Provider: No att. providers found  Discharging Provider: Dr. Juliocesar Severino   To contact this individual call [phone]  and ask the  to page. If unavailable, ask to be transferred to [facility] Provider on call. A Behavioral Health Provider will be available on call 24/7 and during holidays     Primary Care Provider: Anca Davenport NP    Allergies   Allergen Reactions    Naproxen Other (comments)    Abilify [Aripiprazole] Unknown (comments)    Cymbalta [Duloxetine] Other (comments)     Hypotension    Lexapro [Escitalopram] Not Reported This Time    Mercury (Bulk) Rash    Morphine Rash    Risperidone Other (comments)     seizures          H&P Summary Notes      H&P by Mirtha Hernandes MD at 09/19/17 1222     Author:  Mirtha Hernandes MD Service:  PSYCHIATRY Author Type:  Physician    Filed:  09/19/17 1409 Date of Service:  09/19/17 1222 Status:  Signed    :  Mirtha Hernandes MD (Physician)             INITIAL PSYCHIATRIC EVALUATION            IDENTIFICATION:    Patient Name  Tuyet Simons   Date of Birth 1960   [facility] [id number]   Medical Record Number  [id number]      Age  61 y.o. PCP Anca Davenport NP   Admit date:  9/18/2017    Room Number  727/02  @ [facility]   Date of Service  9/19/2017            HISTORY         REASON FOR HOSPITALIZATION:  CC: \"i have memory problems and marital problems\". Pt admitted under a voluntary basis for for medication non compliance, memory loos and marital problems, with no SI/HI/intent or plan or AH/VH. proving to be an imminent danger to self[MH1.1] and[MH1.2]  an inability to care for self. HISTORY OF PRESENT ILLNESS:    The patient, Tuyet Simons, is a 61 y.o.   WHITE [place]  female with a past psychiatric history significant for[MH1.1] memory loss (atypical) and depression[MH1.2] , who presents at this time with complaints of (and/or evidence of) the following emotional symptoms: reported atypical memeory loss and marital problems and medication non compliance. Additional symptomatology include anxiety. The above symptoms have been present for years . These symptoms are of acute severity. These symptoms are constant in nature. The patient's condition has been precipitated by medication non compliance and psychosocial stressors (not participating in supportive psychotherapy ). Patient's condition made worse by treatment noncompliance. UDS: negative; BAL=0. ALLERGIES:   Allergies   Allergen Reactions    Naproxen Other (comments)    Abilify [Aripiprazole] Unknown (comments)    Cymbalta [Duloxetine] Other (comments)     Hypotension    Lexapro [Escitalopram] Not Reported This Time    Mercury (Bulk) Rash    Morphine Rash    Risperidone Other (comments)     seizures      MEDICATIONS PRIOR TO ADMISSION:   Prescriptions Prior to Admission   Medication Sig    dextroamphetamine-amphetamine (ADDERALL) 20 mg tablet Take 20 mg by mouth daily.  dextroamphetamine-amphetamine (ADDERALL) 20 mg tablet Take 10 mg by mouth daily (after lunch). @ 1600          asenapine (SAPHRIS) 10 mg subl 10 mg by SubLINGual route every evening.  LORazepam (ATIVAN) 1 mg tablet Take 1-2 mg by mouth nightly.  prenatal vit-iron fumarate-fa (PRENATAL PLUS WITH IRON) 28 mg iron- 800 mcg tab Take 1 Tab by mouth two (2) times a day.  cyanocobalamin (VITAMIN B12) 1,000 mcg/mL injection 1,000 mcg by IntraMUSCular route Every Saturday.  vitamin E (AQUA GEMS) 400 unit capsule Take 400 Units by mouth daily.  KRILL OIL PO Take 1 Cap by mouth daily.  ergocalciferol (ERGOCALCIFEROL) 50,000 unit capsule Take 50,000 Units by mouth Every Friday.  IRON PS CPLX/ASCORBIC ACID (IRON PS COMPLEX-ASCORBIC ACID PO) Take 1 Tab by mouth daily.       PAST MEDICAL HISTORY:   Past Medical History:   Diagnosis Date    Anemia NEC     Anxiety disorder     Depression     Hypothyroid     Liver disease     past liver failure    Lyme disease     [facility] [address]     Other ill-defined conditions     tramatic brain injury    Psychiatric disorder     depression    Sleep disorder     Stroke ([facility] [address] 1990s    TIA    Suicidal thoughts      Past Surgical History:   Procedure Laterality Date    HX GASTRIC BYPASS  96    By Dr Milagros Tello HX GYN      hysterectomy/    HX ORTHOPAEDIC      back      SOCIAL HISTORY:    Social History     Social History    Marital status:      Spouse name: N/A    Number of children: N/A    Years of education: N/A     Occupational History    Not on file. Social History Main Topics    Smoking status: Light Tobacco Smoker    Smokeless tobacco: Former User     Quit date: 2017    Alcohol use No    Drug use: Yes     Special: Marijuana      Comment: used a week ago    Sexual activity: Yes     Partners: Male     Birth control/ protection: None     Other Topics Concern    Not on file     Social History Narrative    61year old   female admitted because she wanted some supportive psychotherapy for what she reports is very atypical memory loss symptoms , which were not consistent throughout presentation. Pt is upset with  because he doesn't act the way she would like him to with respect to her reported memory loss. Patient has a PCP that prescribes her psychiatric medications. 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 We recommended follow up with a memory care specialist neurologist and supportive psychotherapy. Patient was able to provide insurance information and assist with this disposition.        FAMILY HISTORY:    Family History   Problem Relation Age of Onset    Heart Attack Mother     Suicide Brother     COPD Father     Diabetes Father     Hypertension Father        REVIEW OF SYSTEMS:[MH1.1]   Psychological ROS: positive for - behavioral disorder  Respiratory ROS: no cough, shortness of breath, or wheezing  Cardiovascular ROS: no chest pain or dyspnea on exertion[MH1.2]  Pertinent items are noted in the History of Present Illness. All other Systems reviewed and are considered negative. MENTAL STATUS EXAM & VITALS     MENTAL STATUS EXAM (MSE):    MSE FINDINGS ARE WITHIN NORMAL LIMITS (WNL) UNLESS OTHERWISE STATED BELOW. ( ALL OF THE BELOW CATEGORIES OF THE MSE HAVE BEEN REVIEWED (reviewed 9/19/2017) AND UPDATED AS DEEMED APPROPRIATE )  General Presentation[MH1.1] age appropriate[MH1.2],[MH1.1] cooperative[MH1.2]   Orientation[MH1.1] oriented to time, place and person[MH1.2]   Vital Signs  See below (reviewed 9/19/2017); Vital Signs (BP, Pulse, & Temp) are within normal limits if not listed below. Gait and Station Stable/steady, no ataxia   Musculoskeletal System No extrapyramidal symptoms (EPS); no abnormal muscular movements or Tardive Dyskinesia (TD); muscle strength and tone are within normal limits   Language No aphasia or dysarthria   Speech:[MH1.1]  monotone[MH1.2]   Thought Processes[MH1.1] concrete[MH1.2]; [MH1.1] normal rate of thoughts[MH1.2]; [MH1.1] poor abstract reasoning/computation[MH1.2]   Thought Associations[MH1.1] circumstantial[MH1.2]   Thought Content[MH1.1] free of delusions and free of hallucinations[MH1.2]   Suicidal Ideations[MH1.1] none[MH1.2]   Homicidal Ideations[MH1.1] none[MH1.2]   Mood:[MH1.1]  irritable[MH1.2]   Affect:[MH1.1]  mood-congruent[MH1.2]   Memory recent[MH1.1]  fair[MH1.2]   Memory remote:[MH1.1]  fair[MH1.2]   Concentration/Attention:[MH1.1]  fair[MH1.2]   Fund of Knowledge[MH1.1] average[MH1.2]   Insight:[MH1.1]  poor[MH1.2]   Reliability[MH1.1] untruthful[MH1.2]   Judgment:[MH1.1]  poor[MH1.2]          VITALS:     Patient Vitals for the past 24 hrs:   Temp Pulse Resp BP SpO2   09/19/17 0809 98 °F (36.7 °C) 78 16 117/72 96 % 09/19/17 0500 97.8 °F (36.6 °C) 96 18 115/74 95 %   09/1960 98 °F (36.7 °C) (!) 102 18 141/83 96 %   09/18/17 1925 98.2 °F (36.8 °C) 74 15 110/65 97 %   09/18/17 1447 97.9 °F (36.6 °C) 80 18 120/70 97 %     Wt Readings from Last 3 Encounters:   09/18/17 91.8 kg (202 lb 6.4 oz)   06/29/17 88.2 kg (194 lb 6 oz)   03/23/16 79.4 kg (175 lb)     Temp Readings from Last 3 Encounters:   09/19/17 98 °F (36.7 °C)   06/29/17 97.8 °F (36.6 °C)   07/02/14 97.7 °F (36.5 °C)     BP Readings from Last 3 Encounters:   09/19/17 117/72   06/29/17 125/73   03/23/16 121/70     Pulse Readings from Last 3 Encounters:   09/19/17 78   06/29/17 72   03/23/16 85            DATA     LABORATORY DATA:  Labs Reviewed   ACETAMINOPHEN - Abnormal; Notable for the following:        Result Value    Acetaminophen level <2 (*)     All other components within normal limits   CBC WITH AUTOMATED DIFF - Abnormal; Notable for the following:     HGB 8.2 (*)     HCT 28.7 (*)     MCV 74.5 (*)     MCH 21.3 (*)     MCHC 28.6 (*)     RDW 18.8 (*)     PLATELET 480 (*)     All other components within normal limits   METABOLIC PANEL, COMPREHENSIVE - Abnormal; Notable for the following:     Potassium 3.3 (*)     Chloride 112 (*)     Glucose 161 (*)     BUN/Creatinine ratio 22 (*)     Calcium 7.9 (*)     ALT (SGPT) 221 (*)     AST (SGOT) 99 (*)     Alk.  phosphatase 155 (*)     Protein, total 6.2 (*)     Albumin 3.0 (*)     A-G Ratio 0.9 (*)     All other components within normal limits   SALICYLATE - Abnormal; Notable for the following:     SALICYLATE <8.4 (*)     All other components within normal limits   ETHYL ALCOHOL   DRUG SCREEN, URINE   URINALYSIS W/ RFLX MICROSCOPIC   TSH 3RD GENERATION   GLUCOSE, FASTING   LIPID PANEL     Admission on 09/18/2017   Component Date Value Ref Range Status    Acetaminophen level 09/18/2017 <2* 10 - 30 ug/mL Final    ALCOHOL(ETHYL),SERUM 09/18/2017 <10  <10 MG/DL Final    WBC 09/18/2017 6.8  3.6 - 11.0 K/uL Final    RBC 09/18/2017 3.85  3.80 - 5.20 M/uL Final    HGB 09/18/2017 8.2* 11.5 - 16.0 g/dL Final    HCT 09/18/2017 28.7* 35.0 - 47.0 % Final    MCV 09/18/2017 74.5* 80.0 - 99.0 FL Final    MCH 09/18/2017 21.3* 26.0 - 34.0 PG Final    MCHC 09/18/2017 28.6* 30.0 - 36.5 g/dL Final    RDW 09/18/2017 18.8* 11.5 - 14.5 % Final    PLATELET 09/92/5131 750* 150 - 400 K/uL Final    NEUTROPHILS 09/18/2017 56  32 - 75 % Final    LYMPHOCYTES 09/18/2017 33  12 - 49 % Final    MONOCYTES 09/18/2017 10  5 - 13 % Final    EOSINOPHILS 09/18/2017 1  0 - 7 % Final    BASOPHILS 09/18/2017 0  0 - 1 % Final    ABS. NEUTROPHILS 09/18/2017 3.8  1.8 - 8.0 K/UL Final    ABS. LYMPHOCYTES 09/18/2017 2.2  0.8 - 3.5 K/UL Final    ABS. MONOCYTES 09/18/2017 0.7  0.0 - 1.0 K/UL Final    ABS. EOSINOPHILS 09/18/2017 0.1  0.0 - 0.4 K/UL Final    ABS.  BASOPHILS 09/18/2017 0.0  0.0 - 0.1 K/UL Final    DF 09/18/2017 SMEAR SCANNED    Final    PLATELET COMMENTS 46/47/5640 LARGE PLATELETS    Final    RBC COMMENTS 09/18/2017     Final                    Value:HYPOCHROMIA  2+      RBC COMMENTS 09/18/2017     Final                    Value:ANISOCYTOSIS  1+      RBC COMMENTS 09/18/2017     Final                    Value:MICROCYTOSIS  PRESENT      Sodium 09/18/2017 142  136 - 145 mmol/L Final    Potassium 09/18/2017 3.3* 3.5 - 5.1 mmol/L Final    Chloride 09/18/2017 112* 97 - 108 mmol/L Final    CO2 09/18/2017 22  21 - 32 mmol/L Final    Anion gap 09/18/2017 8  5 - 15 mmol/L Final    Glucose 09/18/2017 161* 65 - 100 mg/dL Final    BUN 09/18/2017 17  6 - 20 MG/DL Final    Creatinine 09/18/2017 0.79  0.55 - 1.02 MG/DL Final    BUN/Creatinine ratio 09/18/2017 22* 12 - 20   Final    GFR est AA 09/18/2017 >60  >60 ml/min/1.73m2 Final    GFR est non-AA 09/18/2017 >60  >60 ml/min/1.73m2 Final    Calcium 09/18/2017 7.9* 8.5 - 10.1 MG/DL Final    Bilirubin, total 09/18/2017 0.6  0.2 - 1.0 MG/DL Final    ALT (SGPT) 09/18/2017 221* 12 - 78 U/L Final  AST (SGOT) 09/18/2017 99* 15 - 37 U/L Final    Alk.  phosphatase 09/18/2017 155* 45 - 117 U/L Final    Protein, total 09/18/2017 6.2* 6.4 - 8.2 g/dL Final    Albumin 09/18/2017 3.0* 3.5 - 5.0 g/dL Final    Globulin 09/18/2017 3.2  2.0 - 4.0 g/dL Final    A-G Ratio 09/18/2017 0.9* 1.1 - 2.2   Final    AMPHETAMINES 09/18/2017 NEGATIVE   NEG   Final    BARBITURATES 09/18/2017 NEGATIVE   NEG   Final    BENZODIAZEPINE 09/18/2017 NEGATIVE   NEG   Final    COCAINE 09/18/2017 NEGATIVE   NEG   Final    METHADONE 09/18/2017 NEGATIVE   NEG   Final    OPIATES 09/18/2017 NEGATIVE   NEG   Final    PCP(PHENCYCLIDINE) 09/18/2017 NEGATIVE   NEG   Final    THC (TH-CANNABINOL) 09/18/2017 NEGATIVE   NEG   Final    Drug screen comment 09/18/2017 (NOTE)   Final    SALICYLATE 73/83/9479 <9.7* 2.8 - 20.0 MG/DL Final    Color 09/18/2017 YELLOW/STRAW    Final    Appearance 09/18/2017 CLEAR  CLEAR   Final    Specific gravity 09/18/2017 1.023  1.003 - 1.030   Final    pH (UA) 09/18/2017 5.5  5.0 - 8.0   Final    Protein 09/18/2017 NEGATIVE   NEG mg/dL Final    Glucose 09/18/2017 NEGATIVE   NEG mg/dL Final    Ketone 09/18/2017 NEGATIVE   NEG mg/dL Final    Bilirubin 09/18/2017 NEGATIVE   NEG   Final    Blood 09/18/2017 NEGATIVE   NEG   Final    Urobilinogen 09/18/2017 1.0  0.2 - 1.0 EU/dL Final    Nitrites 09/18/2017 NEGATIVE   NEG   Final    Leukocyte Esterase 09/18/2017 NEGATIVE   NEG   Final    TSH 09/18/2017 1.89  0.36 - 3.74 uIU/mL Final    Glucose 09/19/2017 83  65 - 100 MG/DL Final    LIPID PROFILE 09/19/2017        Final    Cholesterol, total 09/19/2017 88  <200 MG/DL Final    Triglyceride 09/19/2017 60  <150 MG/DL Final    HDL Cholesterol 09/19/2017 40  MG/DL Final    LDL, calculated 09/19/2017 36  0 - 100 MG/DL Final    VLDL, calculated 09/19/2017 12  MG/DL Final    CHOL/HDL Ratio 09/19/2017 2.2  0 - 5.0   Final        RADIOLOGY REPORTS:    Results from Hospital Encounter encounter on 03/30/11 XR CHEST PA AND LATERAL   Narrative **Final Report**      ICD Codes / Adm. Diagnosis: 311   / DEPRESSION    Examination:  CR CHEST PA AND LATERAL  - [id number] - Mar 31 2011  9:31AM  Accession No:  [id number]  Reason:  depression      REPORT:  INDICATION: Depression. COMPARISON: None. FINDINGS: PA and lateral radiographs of the chest demonstrate clear lungs. The cardiac and mediastinal contours and pulmonary vascularity are normal.    The bones and soft tissues are within normal limits. There are surgical   clips and staples in the left upper quadrant of the abdomen. IMPRESSION: Normal chest.          Interpreting/Reading Doctor: Briana Hidalgo ([id number])  Transcribed: n/a on 03/31/2011  Approved: Briana Hidalgo ([id number])  03/31/2011          Distribution:  Attending Doctor: Larry Gill Doctor: Brina Bernal        No results found.            MEDICATIONS       ALL MEDICATIONS  Current Facility-Administered Medications   Medication Dose Route Frequency    ziprasidone (GEODON) 20 mg in sterile water (preservative free) 1 mL injection  20 mg IntraMUSCular BID PRN    OLANZapine (ZyPREXA) tablet 5 mg  5 mg Oral Q6H PRN    benztropine (COGENTIN) tablet 2 mg  2 mg Oral BID PRN    benztropine (COGENTIN) injection 2 mg  2 mg IntraMUSCular BID PRN    LORazepam (ATIVAN) injection 2 mg  2 mg IntraMUSCular Q4H PRN    LORazepam (ATIVAN) tablet 1 mg  1 mg Oral Q4H PRN    acetaminophen (TYLENOL) tablet 650 mg  650 mg Oral Q4H PRN    magnesium hydroxide (MILK OF MAGNESIA) 400 mg/5 mL oral suspension 30 mL  30 mL Oral DAILY PRN    nicotine (NICODERM CQ) 21 mg/24 hr patch 1 Patch  1 Patch TransDERmal DAILY PRN    zolpidem (AMBIEN) tablet 5 mg  5 mg Oral QHS PRN    influenza vaccine 2017-18 (3 yrs+)(PF) (FLUZONE QUAD/FLUARIX QUAD) injection 0.5 mL  0.5 mL IntraMUSCular PRIOR TO DISCHARGE      SCHEDULED MEDICATIONS  Current Facility-Administered Medications   Medication Dose Route Frequency    influenza vaccine 2017-18 (3 yrs+)(PF) (FLUZONE QUAD/FLUARIX QUAD) injection 0.5 mL  0.5 mL IntraMUSCular PRIOR TO DISCHARGE                ASSESSMENT & PLAN        The patient, Anai Guaman, is a 61 y.o.  female who presents at this time for treatment of the following diagnoses:  Patient Active Hospital Problem List:   Depression (9/18/2017)    Assessment:[MH1.1] sadness, hopelesnes, helplessness, poor energy, poor sleep[MH1.2]    Plan:[MH1.1] restart home antidepressants - pt. Had not been fully compliants[MH1.2]   Factitious disorder (9/19/2017)    Assessment:[MH1.1] production of symptoms in order to assume the patient role[MH1.2]     Plan:[MH1.1] educations and supportive psychotherapy[MH1.2]          I will continue to monitor blood levels (Depakote, Tegretol, lithium, clozapine---a drug with a narrow therapeutic index= NTI) and associated labs for drug therapy implemented that require intense monitoring for toxicity as deemed appropriate based on current medication side effects and pharmacodynamically determined drug 1/2 lives. A coordinated, multidisplinary treatment team (includes the nurse, unit pharmcist,  and writer) round was conducted for this initial evaluation with the patient present. The following regarding medications was addressed during rounds with patient:[MH1.1] saphris[MH1.2]  the risks and benefits of the proposed medication. The patient was given the opportunity to ask questions. Informed consent given to the use of the above medications. I will continue to adjust psychiatric and non-psychiatric medications (see above \"medication\" section and orders section for details) as deemed appropriate & based upon diagnoses and response to treatment. I have reviewed admission (and previous/old) labs and medical tests in the EHR and or transferring hospital documents.  I will continue to order blood tests/labs and diagnostic tests as deemed appropriate and review results as they become available (see orders for details). I have reviewed old psychiatric and medical records available in the EHR. I Will order additional psychiatric records from other institutions to further elucidate the nature of patient's psychopathology and review once available. I will gather additional collateral information from friends, family and o/p treatment team to further elucidate the nature of patient's psychopathology and baselline level of psychiatric functioning. ESTIMATED LENGTH OF STAY:[MH1.1]    2 days[MH1.2]        STRENGTHS:[MH1.1]  Exercising self-direction/Resourceful, Access to housing/residential stability and Interpersonal/supportive relationships (family, friends, peers)[MH1.2]                                        SIGNED:    Mary Linares MD  9/19/2017[MH1.1]       Revision History       User Key Date/Time User Provider Type Action    > MH1.2 09/19/17 1404 Mary Linares MD Physician Sign     MH1.1 09/19/17 1222 Mary Linares MD Physician               Admission Diagnosis: Depression  Depression    * No surgery found *    Results for orders placed or performed during the hospital encounter of 09/18/17   ACETAMINOPHEN   Result Value Ref Range    Acetaminophen level <2 (L) 10 - 30 ug/mL   ETHYL ALCOHOL   Result Value Ref Range    ALCOHOL(ETHYL),SERUM <10 <10 MG/DL   CBC WITH AUTOMATED DIFF   Result Value Ref Range    WBC 6.8 3.6 - 11.0 K/uL    RBC 3.85 3.80 - 5.20 M/uL    HGB 8.2 (L) 11.5 - 16.0 g/dL    HCT 28.7 (L) 35.0 - 47.0 %    MCV 74.5 (L) 80.0 - 99.0 FL    MCH 21.3 (L) 26.0 - 34.0 PG    MCHC 28.6 (L) 30.0 - 36.5 g/dL    RDW 18.8 (H) 11.5 - 14.5 %    PLATELET 186 (H) 099 - 400 K/uL    NEUTROPHILS 56 32 - 75 %    LYMPHOCYTES 33 12 - 49 %    MONOCYTES 10 5 - 13 %    EOSINOPHILS 1 0 - 7 %    BASOPHILS 0 0 - 1 %    ABS. NEUTROPHILS 3.8 1.8 - 8.0 K/UL    ABS. LYMPHOCYTES 2.2 0.8 - 3.5 K/UL    ABS. MONOCYTES 0.7 0.0 - 1.0 K/UL    ABS. EOSINOPHILS 0.1 0.0 - 0.4 K/UL    ABS. BASOPHILS 0.0 0.0 - 0.1 K/UL    DF SMEAR SCANNED      PLATELET COMMENTS LARGE PLATELETS      RBC COMMENTS HYPOCHROMIA  2+        RBC COMMENTS ANISOCYTOSIS  1+        RBC COMMENTS MICROCYTOSIS  PRESENT       METABOLIC PANEL, COMPREHENSIVE   Result Value Ref Range    Sodium 142 136 - 145 mmol/L    Potassium 3.3 (L) 3.5 - 5.1 mmol/L    Chloride 112 (H) 97 - 108 mmol/L    CO2 22 21 - 32 mmol/L    Anion gap 8 5 - 15 mmol/L    Glucose 161 (H) 65 - 100 mg/dL    BUN 17 6 - 20 MG/DL    Creatinine 0.79 0.55 - 1.02 MG/DL    BUN/Creatinine ratio 22 (H) 12 - 20      GFR est AA >60 >60 ml/min/1.73m2    GFR est non-AA >60 >60 ml/min/1.73m2    Calcium 7.9 (L) 8.5 - 10.1 MG/DL    Bilirubin, total 0.6 0.2 - 1.0 MG/DL    ALT (SGPT) 221 (H) 12 - 78 U/L    AST (SGOT) 99 (H) 15 - 37 U/L    Alk.  phosphatase 155 (H) 45 - 117 U/L    Protein, total 6.2 (L) 6.4 - 8.2 g/dL    Albumin 3.0 (L) 3.5 - 5.0 g/dL    Globulin 3.2 2.0 - 4.0 g/dL    A-G Ratio 0.9 (L) 1.1 - 2.2     DRUG SCREEN, URINE   Result Value Ref Range    AMPHETAMINES NEGATIVE  NEG      BARBITURATES NEGATIVE  NEG      BENZODIAZEPINE NEGATIVE  NEG      COCAINE NEGATIVE  NEG      METHADONE NEGATIVE  NEG      OPIATES NEGATIVE  NEG      PCP(PHENCYCLIDINE) NEGATIVE  NEG      THC (TH-CANNABINOL) NEGATIVE  NEG      Drug screen comment (NOTE)    SALICYLATE   Result Value Ref Range    SALICYLATE <5.4 (L) 2.8 - 20.0 MG/DL   URINALYSIS W/ RFLX MICROSCOPIC   Result Value Ref Range    Color YELLOW/STRAW      Appearance CLEAR CLEAR      Specific gravity 1.023 1.003 - 1.030      pH (UA) 5.5 5.0 - 8.0      Protein NEGATIVE  NEG mg/dL    Glucose NEGATIVE  NEG mg/dL    Ketone NEGATIVE  NEG mg/dL    Bilirubin NEGATIVE  NEG      Blood NEGATIVE  NEG      Urobilinogen 1.0 0.2 - 1.0 EU/dL    Nitrites NEGATIVE  NEG      Leukocyte Esterase NEGATIVE  NEG     TSH 3RD GENERATION   Result Value Ref Range    TSH 1.89 0.36 - 3.74 uIU/mL   GLUCOSE, FASTING   Result Value Ref Range    Glucose 83 65 - 100 MG/DL LIPID PANEL   Result Value Ref Range    LIPID PROFILE          Cholesterol, total 88 <200 MG/DL    Triglyceride 60 <150 MG/DL    HDL Cholesterol 40 MG/DL    LDL, calculated 36 0 - 100 MG/DL    VLDL, calculated 12 MG/DL    CHOL/HDL Ratio 2.2 0 - 5.0         Immunizations administered during this encounter: There is no immunization history for the selected administration types on file for this patient. Screening for Metabolic Disorders for Patients on Antipsychotic Medications  (Data obtained from the EMR)    Estimated Body Mass Index  Estimated body mass index is 29.04 kg/(m^2) as calculated from the following:    Height as of this encounter: 5' 10\" (1.778 m). Weight as of this encounter: 91.8 kg (202 lb 6.4 oz). Vital Signs/Blood Pressure  Visit Vitals    /81    Pulse 100    Temp 98.2 °F (36.8 °C)    Resp 18    Ht 5' 10\" (1.778 m)    Wt 91.8 kg (202 lb 6.4 oz)    SpO2 97%    Breastfeeding No    BMI 29.04 kg/m2       Blood Glucose/Hemoglobin A1c  Lab Results   Component Value Date/Time    Glucose 83 2017 05:06 AM        No results found for: HBA1C, HGBE8, QPV2UTCQ     Lipid Panel  Lab Results   Component Value Date/Time    Cholesterol, total 88 2017 05:06 AM    HDL Cholesterol 40 2017 05:06 AM    LDL, calculated 36 2017 05:06 AM    Triglyceride 60 2017 05:06 AM    CHOL/HDL Ratio 2.2 2017 05:06 AM       Discharge Diagnosis: Depression     Discharge Plan[de-identified] She will return home with her  . DISCHARGE SUMMARY    Razia Mittal  : 1960  MRN: [id number]    The patient Shirley Limon exhibits the ability to control behavior in a less restrictive environment. Patient's level of functioning is improving. No assaultive/destructive behavior has been observed for the past 24 hours. No suicidal/homicidal threat or behavior has been observed for the past 24 hours. There is no evidence of serious medication side effects.   Patient has not been in physical or protective restraints for at least the past 24 hours. If weapons involved, how are they secured? No weapons involved     Is patient aware of and in agreement with discharge plan? Patient is aware of discharge and is in agreement     Arrangements for medication:no prescriptions given to patient. Copy of discharge instructions to  provider?:  No, she will follow up with her PCP no changes in medications , referred to a therapist     Arrangements for transportation home:  Family to      Keep all follow up appointments as scheduled, continue to take prescribed medications per physician instructions. Mental health crisis number:  [phone] or your local mental health crisis line number at [phone]             Discharge Medication List and Instructions:   Discharge Medication List as of 9/19/2017 12:45 PM      CONTINUE these medications which have NOT CHANGED    Details   asenapine (SAPHRIS) 10 mg subl 10 mg by SubLINGual route every evening., Historical Med      LORazepam (ATIVAN) 1 mg tablet Take 1-2 mg by mouth nightly., Historical Med      prenatal vit-iron fumarate-fa (PRENATAL PLUS WITH IRON) 28 mg iron- 800 mcg tab Take 1 Tab by mouth two (2) times a day., Historical Med      cyanocobalamin (VITAMIN B12) 1,000 mcg/mL injection 1,000 mcg by IntraMUSCular route Every Saturday., Historical Med      vitamin E (AQUA GEMS) 400 unit capsule Take 400 Units by mouth daily. , Historical Med      KRILL OIL PO Take 1 Cap by mouth daily. , Historical Med      ergocalciferol (ERGOCALCIFEROL) 50,000 unit capsule Take 50,000 Units by mouth Every Friday., Historical Med      IRON PS CPLX/ASCORBIC ACID (IRON PS COMPLEX-ASCORBIC ACID PO) Take 1 Tab by mouth daily. , Historical Med         STOP taking these medications       dextroamphetamine-amphetamine (ADDERALL) 20 mg tablet Comments:   Reason for Stopping:         dextroamphetamine-amphetamine (ADDERALL) 20 mg tablet Comments:   Reason for Stopping: AMPHETAMINE SALT COMBO 20 mg tablet Comments:   Reason for Stopping:               Unresulted Labs     None        To obtain results of studies pending at discharge, please contact [phone]     Follow-up Information     Follow up With Details Comments Kristyn Faith NP Schedule an appointment as soon as possible for a visit  [facility]  [phone]      List of therapist given to patient              Advanced Directive:   Does the patient have an appointed surrogate decision maker? No  Does the patient have a Medical Advance Directive? No  Does the patient have a Psychiatric Advance Directive? No  If the patient does not have a surrogate or Medical Advance Directive AND Psychiatric Advance Directive, the patient was offered information on these advance directives Patient declined to complete      Patient Instructions: Please continue all medications until otherwise directed by physician. Tobacco Cessation Discharge Plan:   Is the patient a smoker and needs referral for smoking cessation? No  Patient referred to the following for smoking cessation with an appointment? Not applicable     Patient was offered medication to assist with smoking cessation at discharge? Not applicable  Was education for smoking cessation added to the discharge instructions? Not applicable    Alcohol/Substance Abuse Discharge Plan:   Does the patient have a history of substance/alcohol abuse and requires a referral for treatment? No  Patient referred to the following for substance/alcohol abuse treatment with an appointment? Not applicable  Patient was offered medication to assist with alcohol cessation at discharge? Not applicable  Was education for substance/alcohol abuse added to discharge instructions? Not applicable    Patient discharged to Home; discussed with patient/caregiver, provided to the patient/caregiver either in hard copy or electronically.  and patient refused hard copy.

## 2017-09-19 NOTE — DISCHARGE INSTRUCTIONS
DISCHARGE SUMMARY    Tatyana Kendrick  : 1960  MRN: [id number]    The patient Armida Child exhibits the ability to control behavior in a less restrictive environment. Patient's level of functioning is improving. No assaultive/destructive behavior has been observed for the past 24 hours. No suicidal/homicidal threat or behavior has been observed for the past 24 hours. There is no evidence of serious medication side effects. Patient has not been in physical or protective restraints for at least the past 24 hours. If weapons involved, how are they secured? No weapons involved     Is patient aware of and in agreement with discharge plan? Patient is aware of discharge and is in agreement     Arrangements for medication:no prescriptions given to patient. Copy of discharge instructions to  provider?:  No, she will follow up with her PCP no changes in medications , referred to a therapist     Arrangements for transportation home:  Family to      Keep all follow up appointments as scheduled, continue to take prescribed medications per physician instructions. Mental health crisis number:  [phone] or your local mental health crisis line number at [facility] from Nurse    The following personal items are in your possession at time of discharge:    Dental Appliances: None  Visual Aid: Glasses, With patient     Home Medications: None  Jewelry: None  Clothing: Shirt, Socks, Undergarments, Pants  Other Valuables: Eyeglasses, Money (comment), Personal toiletries  Personal Items Sent to Safe: Money, ID          PATIENT INSTRUCTIONS:      What to do at Home:  Recommended activity: Activity as tolerated,     If you experience any of the following symptoms increased hopelessness or thoughts of self harm , please follow up with [phone] . *  Please give a list of your current medications to your Primary Care Provider.     *  Please update this list whenever your medications are discontinued, doses are      changed, or new medications (including over-the-counter products) are added. *  Please carry medication information at all times in case of emergency situations. These are general instructions for a healthy lifestyle:    No smoking/ No tobacco products/ Avoid exposure to second hand smoke    Surgeon General's Warning:  Quitting smoking now greatly reduces serious risk to your health. Obesity, smoking, and sedentary lifestyle greatly increases your risk for illness    A healthy diet, regular physical exercise & weight monitoring are important for maintaining a healthy lifestyle    You may be retaining fluid if you have a history of heart failure or if you experience any of the following symptoms:  Weight gain of 3 pounds or more overnight or 5 pounds in a week, increased swelling in our hands or feet or shortness of breath while lying flat in bed. Please call your doctor as soon as you notice any of these symptoms; do not wait until your next office visit. Recognize signs and symptoms of STROKE:    F-face looks uneven    A-arms unable to move or move unevenly    S-speech slurred or non-existent    T-time-call 911 as soon as signs and symptoms begin-DO NOT go       Back to bed or wait to see if you get better-TIME IS BRAIN. Warning Signs of HEART ATTACK     Call 911 if you have these symptoms:   Chest discomfort. Most heart attacks involve discomfort in the center of the chest that lasts more than a few minutes, or that goes away and comes back. It can feel like uncomfortable pressure, squeezing, fullness, or pain.  Discomfort in other areas of the upper body. Symptoms can include pain or discomfort in one or both arms, the back, neck, jaw, or stomach.  Shortness of breath with or without chest discomfort.  Other signs may include breaking out in a cold sweat, nausea, or lightheadedness. Don't wait more than five minutes to call 911 - MINUTES MATTER!  Fast action can save your life. Calling 911 is almost always the fastest way to get lifesaving treatment. Emergency Medical Services staff can begin treatment when they arrive -- up to an hour sooner than if someone gets to the hospital by car. The discharge information has been reviewed with the patient. The patient verbalized understanding. Discharge medications reviewed with the patient and appropriate educational materials and side effects teaching were provided.

## 2017-09-19 NOTE — H&P
INITIAL PSYCHIATRIC EVALUATION            IDENTIFICATION:    Patient Name  Geoffrey Hill   Date of Birth 1960   [facility] [id number]   Medical Record Number  [id number]      Age  61 y.o. PCP Dayne Lerma NP   Admit date:  9/18/2017    Room Number  727/02  @ [facility]   Date of Service  9/19/2017            HISTORY         REASON FOR HOSPITALIZATION:  CC: \"i have memory problems and marital problems\". Pt admitted under a voluntary basis for for medication non compliance, memory loos and marital problems, with no SI/HI/intent or plan or AH/VH. proving to be an imminent danger to self and  an inability to care for self. HISTORY OF PRESENT ILLNESS:    The patient, Geoffrey Hill, is a 61 y.o. WHITE [place]  female with a past psychiatric history significant for memory loss (atypical) and depression , who presents at this time with complaints of (and/or evidence of) the following emotional symptoms: reported atypical memeory loss and marital problems and medication non compliance. Additional symptomatology include anxiety. The above symptoms have been present for years . These symptoms are of acute severity. These symptoms are constant in nature. The patient's condition has been precipitated by medication non compliance and psychosocial stressors (not participating in supportive psychotherapy ). Patient's condition made worse by treatment noncompliance. UDS: negative; BAL=0. ALLERGIES:   Allergies   Allergen Reactions    Naproxen Other (comments)    Abilify [Aripiprazole] Unknown (comments)    Cymbalta [Duloxetine] Other (comments)     Hypotension    Lexapro [Escitalopram] Not Reported This Time    Mercury (Bulk) Rash    Morphine Rash    Risperidone Other (comments)     seizures      MEDICATIONS PRIOR TO ADMISSION:   Prescriptions Prior to Admission   Medication Sig    dextroamphetamine-amphetamine (ADDERALL) 20 mg tablet Take 20 mg by mouth daily.     dextroamphetamine-amphetamine (ADDERALL) 20 mg tablet Take 10 mg by mouth daily (after lunch). @ 1600          asenapine (SAPHRIS) 10 mg subl 10 mg by SubLINGual route every evening.  LORazepam (ATIVAN) 1 mg tablet Take 1-2 mg by mouth nightly.  prenatal vit-iron fumarate-fa (PRENATAL PLUS WITH IRON) 28 mg iron- 800 mcg tab Take 1 Tab by mouth two (2) times a day.  cyanocobalamin (VITAMIN B12) 1,000 mcg/mL injection 1,000 mcg by IntraMUSCular route Every Saturday.  vitamin E (AQUA GEMS) 400 unit capsule Take 400 Units by mouth daily.  KRILL OIL PO Take 1 Cap by mouth daily.  ergocalciferol (ERGOCALCIFEROL) 50,000 unit capsule Take 50,000 Units by mouth Every Friday.  IRON PS CPLX/ASCORBIC ACID (IRON PS COMPLEX-ASCORBIC ACID PO) Take 1 Tab by mouth daily. PAST MEDICAL HISTORY:   Past Medical History:   Diagnosis Date    Anemia NEC     Anxiety disorder     Depression     Hypothyroid     Liver disease     past liver failure    Lyme disease     N[place] ([facility] [address]     Other ill-defined conditions     tramatic brain injury    Psychiatric disorder     depression    Sleep disorder     Stroke ([facility] [address] 1990s    TIA    Suicidal thoughts      Past Surgical History:   Procedure Laterality Date    HX GASTRIC BYPASS  96    By Dr Naya Estrada HX GYN      hysterectomy/    HX ORTHOPAEDIC      back      SOCIAL HISTORY:    Social History     Social History    Marital status:      Spouse name: N/A    Number of children: N/A    Years of education: N/A     Occupational History    Not on file.      Social History Main Topics    Smoking status: Light Tobacco Smoker    Smokeless tobacco: Former User     Quit date: 2017    Alcohol use No    Drug use: Yes     Special: Marijuana      Comment: used a week ago    Sexual activity: Yes     Partners: Male     Birth control/ protection: None     Other Topics Concern    Not on file     Social History Narrative    61year old   female admitted because she wanted some supportive psychotherapy for what she reports is very atypical memory loss symptoms , which were not consistent throughout presentation. Pt is upset with  because he doesn't act the way she would like him to with respect to her reported memory loss. Patient has a PCP that prescribes her psychiatric medications. 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 We recommended follow up with a memory care specialist neurologist and supportive psychotherapy. Patient was able to provide insurance information and assist with this disposition. FAMILY HISTORY:    Family History   Problem Relation Age of Onset    Heart Attack Mother     Suicide Brother     COPD Father     Diabetes Father     Hypertension Father        REVIEW OF SYSTEMS:   Psychological ROS: positive for - behavioral disorder  Respiratory ROS: no cough, shortness of breath, or wheezing  Cardiovascular ROS: no chest pain or dyspnea on exertion  Pertinent items are noted in the History of Present Illness. All other Systems reviewed and are considered negative. MENTAL STATUS EXAM & VITALS     MENTAL STATUS EXAM (MSE):    MSE FINDINGS ARE WITHIN NORMAL LIMITS (WNL) UNLESS OTHERWISE STATED BELOW. ( ALL OF THE BELOW CATEGORIES OF THE MSE HAVE BEEN REVIEWED (reviewed 9/19/2017) AND UPDATED AS DEEMED APPROPRIATE )  General Presentation age appropriate, cooperative   Orientation oriented to time, place and person   Vital Signs  See below (reviewed 9/19/2017); Vital Signs (BP, Pulse, & Temp) are within normal limits if not listed below.    Gait and Station Stable/steady, no ataxia   Musculoskeletal System No extrapyramidal symptoms (EPS); no abnormal muscular movements or Tardive Dyskinesia (TD); muscle strength and tone are within normal limits   Language No aphasia or dysarthria   Speech:  monotone   Thought Processes concrete; normal rate of thoughts; poor abstract reasoning/computation   Thought Associations circumstantial   Thought Content free of delusions and free of hallucinations   Suicidal Ideations none   Homicidal Ideations none   Mood:  irritable   Affect:  mood-congruent   Memory recent  fair   Memory remote:  fair   Concentration/Attention:  fair   Fund of Knowledge average   Insight:  poor   Reliability untruthful   Judgment:  poor          VITALS:     Patient Vitals for the past 24 hrs:   Temp Pulse Resp BP SpO2   09/19/17 0809 98 °F (36.7 °C) 78 16 117/72 96 %   09/19/17 0500 97.8 °F (36.6 °C) 96 18 115/74 95 %   09/1960 98 °F (36.7 °C) (!) 102 18 141/83 96 %   09/18/17 1925 98.2 °F (36.8 °C) 74 15 110/65 97 %   09/18/17 1447 97.9 °F (36.6 °C) 80 18 120/70 97 %     Wt Readings from Last 3 Encounters:   09/18/17 91.8 kg (202 lb 6.4 oz)   06/29/17 88.2 kg (194 lb 6 oz)   03/23/16 79.4 kg (175 lb)     Temp Readings from Last 3 Encounters:   09/19/17 98 °F (36.7 °C)   06/29/17 97.8 °F (36.6 °C)   07/02/14 97.7 °F (36.5 °C)     BP Readings from Last 3 Encounters:   09/19/17 117/72   06/29/17 125/73   03/23/16 121/70     Pulse Readings from Last 3 Encounters:   09/19/17 78   06/29/17 72   03/23/16 85            DATA     LABORATORY DATA:  Labs Reviewed   ACETAMINOPHEN - Abnormal; Notable for the following:        Result Value    Acetaminophen level <2 (*)     All other components within normal limits   CBC WITH AUTOMATED DIFF - Abnormal; Notable for the following:     HGB 8.2 (*)     HCT 28.7 (*)     MCV 74.5 (*)     MCH 21.3 (*)     MCHC 28.6 (*)     RDW 18.8 (*)     PLATELET 924 (*)     All other components within normal limits   METABOLIC PANEL, COMPREHENSIVE - Abnormal; Notable for the following:     Potassium 3.3 (*)     Chloride 112 (*)     Glucose 161 (*)     BUN/Creatinine ratio 22 (*)     Calcium 7.9 (*)     ALT (SGPT) 221 (*)     AST (SGOT) 99 (*)     Alk.  phosphatase 155 (*)     Protein, total 6.2 (*)     Albumin 3.0 (*)     A-G Ratio 0.9 (*)     All other components within normal limits SALICYLATE - Abnormal; Notable for the following:     SALICYLATE <4.7 (*)     All other components within normal limits   ETHYL ALCOHOL   DRUG SCREEN, URINE   URINALYSIS W/ RFLX MICROSCOPIC   TSH 3RD GENERATION   GLUCOSE, FASTING   LIPID PANEL     Admission on 09/18/2017   Component Date Value Ref Range Status    Acetaminophen level 09/18/2017 <2* 10 - 30 ug/mL Final    ALCOHOL(ETHYL),SERUM 09/18/2017 <10  <10 MG/DL Final    WBC 09/18/2017 6.8  3.6 - 11.0 K/uL Final    RBC 09/18/2017 3.85  3.80 - 5.20 M/uL Final    HGB 09/18/2017 8.2* 11.5 - 16.0 g/dL Final    HCT 09/18/2017 28.7* 35.0 - 47.0 % Final    MCV 09/18/2017 74.5* 80.0 - 99.0 FL Final    MCH 09/18/2017 21.3* 26.0 - 34.0 PG Final    MCHC 09/18/2017 28.6* 30.0 - 36.5 g/dL Final    RDW 09/18/2017 18.8* 11.5 - 14.5 % Final    PLATELET 89/47/4575 903* 150 - 400 K/uL Final    NEUTROPHILS 09/18/2017 56  32 - 75 % Final    LYMPHOCYTES 09/18/2017 33  12 - 49 % Final    MONOCYTES 09/18/2017 10  5 - 13 % Final    EOSINOPHILS 09/18/2017 1  0 - 7 % Final    BASOPHILS 09/18/2017 0  0 - 1 % Final    ABS. NEUTROPHILS 09/18/2017 3.8  1.8 - 8.0 K/UL Final    ABS. LYMPHOCYTES 09/18/2017 2.2  0.8 - 3.5 K/UL Final    ABS. MONOCYTES 09/18/2017 0.7  0.0 - 1.0 K/UL Final    ABS. EOSINOPHILS 09/18/2017 0.1  0.0 - 0.4 K/UL Final    ABS.  BASOPHILS 09/18/2017 0.0  0.0 - 0.1 K/UL Final    DF 09/18/2017 SMEAR SCANNED    Final    PLATELET COMMENTS 44/77/2645 LARGE PLATELETS    Final    RBC COMMENTS 09/18/2017     Final                    Value:HYPOCHROMIA  2+      RBC COMMENTS 09/18/2017     Final                    Value:ANISOCYTOSIS  1+      RBC COMMENTS 09/18/2017     Final                    Value:MICROCYTOSIS  PRESENT      Sodium 09/18/2017 142  136 - 145 mmol/L Final    Potassium 09/18/2017 3.3* 3.5 - 5.1 mmol/L Final    Chloride 09/18/2017 112* 97 - 108 mmol/L Final    CO2 09/18/2017 22  21 - 32 mmol/L Final    Anion gap 09/18/2017 8  5 - 15 mmol/L Final    Glucose 09/18/2017 161* 65 - 100 mg/dL Final    BUN 09/18/2017 17  6 - 20 MG/DL Final    Creatinine 09/18/2017 0.79  0.55 - 1.02 MG/DL Final    BUN/Creatinine ratio 09/18/2017 22* 12 - 20   Final    GFR est AA 09/18/2017 >60  >60 ml/min/1.73m2 Final    GFR est non-AA 09/18/2017 >60  >60 ml/min/1.73m2 Final    Calcium 09/18/2017 7.9* 8.5 - 10.1 MG/DL Final    Bilirubin, total 09/18/2017 0.6  0.2 - 1.0 MG/DL Final    ALT (SGPT) 09/18/2017 221* 12 - 78 U/L Final    AST (SGOT) 09/18/2017 99* 15 - 37 U/L Final    Alk.  phosphatase 09/18/2017 155* 45 - 117 U/L Final    Protein, total 09/18/2017 6.2* 6.4 - 8.2 g/dL Final    Albumin 09/18/2017 3.0* 3.5 - 5.0 g/dL Final    Globulin 09/18/2017 3.2  2.0 - 4.0 g/dL Final    A-G Ratio 09/18/2017 0.9* 1.1 - 2.2   Final    AMPHETAMINES 09/18/2017 NEGATIVE   NEG   Final    BARBITURATES 09/18/2017 NEGATIVE   NEG   Final    BENZODIAZEPINE 09/18/2017 NEGATIVE   NEG   Final    COCAINE 09/18/2017 NEGATIVE   NEG   Final    METHADONE 09/18/2017 NEGATIVE   NEG   Final    OPIATES 09/18/2017 NEGATIVE   NEG   Final    PCP(PHENCYCLIDINE) 09/18/2017 NEGATIVE   NEG   Final    THC (TH-CANNABINOL) 09/18/2017 NEGATIVE   NEG   Final    Drug screen comment 09/18/2017 (NOTE)   Final    SALICYLATE 38/73/5439 <1.6* 2.8 - 20.0 MG/DL Final    Color 09/18/2017 YELLOW/STRAW    Final    Appearance 09/18/2017 CLEAR  CLEAR   Final    Specific gravity 09/18/2017 1.023  1.003 - 1.030   Final    pH (UA) 09/18/2017 5.5  5.0 - 8.0   Final    Protein 09/18/2017 NEGATIVE   NEG mg/dL Final    Glucose 09/18/2017 NEGATIVE   NEG mg/dL Final    Ketone 09/18/2017 NEGATIVE   NEG mg/dL Final    Bilirubin 09/18/2017 NEGATIVE   NEG   Final    Blood 09/18/2017 NEGATIVE   NEG   Final    Urobilinogen 09/18/2017 1.0  0.2 - 1.0 EU/dL Final    Nitrites 09/18/2017 NEGATIVE   NEG   Final    Leukocyte Esterase 09/18/2017 NEGATIVE   NEG   Final    TSH 09/18/2017 1.89  0.36 - 3.74 uIU/mL Final    Glucose 09/19/2017 83  65 - 100 MG/DL Final    LIPID PROFILE 09/19/2017        Final    Cholesterol, total 09/19/2017 88  <200 MG/DL Final    Triglyceride 09/19/2017 60  <150 MG/DL Final    HDL Cholesterol 09/19/2017 40  MG/DL Final    LDL, calculated 09/19/2017 36  0 - 100 MG/DL Final    VLDL, calculated 09/19/2017 12  MG/DL Final    CHOL/HDL Ratio 09/19/2017 2.2  0 - 5.0   Final        RADIOLOGY REPORTS:    Results from [place] encounter on 03/30/11   XR CHEST PA AND LATERAL   Narrative **Final Report**      ICD Codes / Adm. Diagnosis: 311   / DEPRESSION    Examination:  CR CHEST PA AND LATERAL  - [id number] - Mar 31 2011  9:31AM  Accession No:  [id number]  Reason:  depression      REPORT:  INDICATION: Depression. COMPARISON: None. FINDINGS: PA and lateral radiographs of the chest demonstrate clear lungs. The cardiac and mediastinal contours and pulmonary vascularity are normal.    The bones and soft tissues are within normal limits. There are surgical   clips and staples in the left upper quadrant of the abdomen. IMPRESSION: Normal chest.          Interpreting/Reading Doctor: Cici Lee ([id number])  Transcribed: n/a on 03/31/2011  Approved: Cici Lee ([id number])  03/31/2011          Distribution:  Attending Doctor: Jonathon Elliott Doctor: Lisa Ignacio        No results found.            MEDICATIONS       ALL MEDICATIONS  Current Facility-Administered Medications   Medication Dose Route Frequency    ziprasidone (GEODON) 20 mg in sterile water (preservative free) 1 mL injection  20 mg IntraMUSCular BID PRN    OLANZapine (ZyPREXA) tablet 5 mg  5 mg Oral Q6H PRN    benztropine (COGENTIN) tablet 2 mg  2 mg Oral BID PRN    benztropine (COGENTIN) injection 2 mg  2 mg IntraMUSCular BID PRN    LORazepam (ATIVAN) injection 2 mg  2 mg IntraMUSCular Q4H PRN    LORazepam (ATIVAN) tablet 1 mg  1 mg Oral Q4H PRN    acetaminophen (TYLENOL) tablet 650 mg  650 mg Oral Q4H PRN    magnesium hydroxide (MILK OF MAGNESIA) 400 mg/5 mL oral suspension 30 mL  30 mL Oral DAILY PRN    nicotine (NICODERM CQ) 21 mg/24 hr patch 1 Patch  1 Patch TransDERmal DAILY PRN    zolpidem (AMBIEN) tablet 5 mg  5 mg Oral QHS PRN    influenza vaccine 2017-18 (3 yrs+)(PF) (FLUZONE QUAD/FLUARIX QUAD) injection 0.5 mL  0.5 mL IntraMUSCular PRIOR TO DISCHARGE      SCHEDULED MEDICATIONS  Current Facility-Administered Medications   Medication Dose Route Frequency    influenza vaccine 2017-18 (3 yrs+)(PF) (FLUZONE QUAD/FLUARIX QUAD) injection 0.5 mL  0.5 mL IntraMUSCular PRIOR TO DISCHARGE                ASSESSMENT & PLAN        The patient, Rosa Pickett, is a 61 y.o.  female who presents at this time for treatment of the following diagnoses:  Patient Active Hospital Problem List:   Depression (9/18/2017)    Assessment: sadness, hopelesnes, helplessness, poor energy, poor sleep    Plan: restart home antidepressants - pt. Had not been fully compliants   Factitious disorder (9/19/2017)    Assessment: production of symptoms in order to assume the patient role     Plan: educations and supportive psychotherapy          I will continue to monitor blood levels (Depakote, Tegretol, lithium, clozapine---a drug with a narrow therapeutic index= NTI) and associated labs for drug therapy implemented that require intense monitoring for toxicity as deemed appropriate based on current medication side effects and pharmacodynamically determined drug 1/2 lives. A coordinated, multidisplinary treatment team (includes the nurse, unit pharmcist,  and writer) round was conducted for this initial evaluation with the patient present. The following regarding medications was addressed during rounds with patient: saphris  the risks and benefits of the proposed medication. The patient was given the opportunity to ask questions. Informed consent given to the use of the above medications.      I will continue to adjust psychiatric and non-psychiatric medications (see above \"medication\" section and orders section for details) as deemed appropriate & based upon diagnoses and response to treatment. I have reviewed admission (and previous/old) labs and medical tests in the EHR and or transferring hospital documents. I will continue to order blood tests/labs and diagnostic tests as deemed appropriate and review results as they become available (see orders for details). I have reviewed old psychiatric and medical records available in the EHR. I Will order additional psychiatric records from other institutions to further elucidate the nature of patient's psychopathology and review once available. I will gather additional collateral information from friends, family and o/p treatment team to further elucidate the nature of patient's psychopathology and baselline level of psychiatric functioning.       ESTIMATED LENGTH OF STAY:    2 days        STRENGTHS:  Exercising self-direction/Resourceful, Access to housing/residential stability and Interpersonal/supportive relationships (family, friends, peers)                                        SIGNED:    Suzanne Scott MD  9/19/2017

## 2017-09-19 NOTE — PROGRESS NOTES
[facility] [address]  Master Treatment Plan for Sven Confer    Date Treatment Plan Initiated: 9/19/17    Treatment Plan Modalities:  Type of Modality Amount  (x minutes) Frequency (x/week) Duration (x days) Name of Responsible Staff   [address]  meetings to encourage peer interactions [address] psychotherapy to assist in building coping skills and internal controls 60 7 1 Ketan Isaac   Therapeutic activity groups to build coping skills 60 7 1 Ketan Isaac   Psychoeducation in group setting to address:   Medication education   [address] skills         Relaxation techniques         Symptom management         Discharge planning   [address]   60 1 1 volunteer   Recovery/AA/NA         Physician medication management   13 7 1 Dr Toshia Herron   Family meeting/discharge planning                                              Problem: Depressed Mood (Adult/Pediatric) these goals will be met by 9/23/17  Goal: *STG: Participates in treatment plan  Outcome: Progressing Towards Goal  Review meds, out on unit with much prompting and encouragement. Presents as helpless and hopeless. Blaming others for her negative emotions and her memory loss for her anxiety. Pt indecisive and deflective when asked her daily goal. Mood and affect is sad to slightly irritable and defensive. Goal: *STG: Verbalizes anger, guilt, and other feelings in a constructive manor  Outcome: Progressing Towards Goal  Anxiety related to her memory loss, anger related to her and her husbands disconnect. Hopeless and helpless related to her lyme disease and past memory loss related to past ECT per pt.    Goal: *STG: Attends activities and groups  Outcome: Progressing Towards Goal  Semi engaged  Goal: *STG: Demonstrates reduction in symptoms and increase in insight into coping skills/future focused  Outcome: Progressing Towards Goal  Denies SI, no self harming behaviors. Symptoms such as hopelessness and helplessness resolved. Pt voices due to her memory loss she is not able to learn new coping skills.  Resistant to recommendations from nursing staff and tx team. Future focused and voices wanting to continue living and attending events with family in future   Goal: Interventions  Outcome: Progressing Towards Goal  Staff focus is on d/c planning

## 2017-09-19 NOTE — PROGRESS NOTES
Skin Assessment performed by: MAYO, RN and NILES RN    Skin is intact      Pressure Ulcer Documentation  (COMPLETE ONE LABEL PER PRESSURE ULCER)  For further information, please review corresponding Wound Care flowsheet. Shayan Voss has:    No pressure injury noted and pressure ulcer prevention initiated.

## 2017-09-19 NOTE — DISCHARGE SUMMARY
PSYCHIATRIC DISCHARGE SUMMARY         IDENTIFICATION:    Patient Name  Francheska Salas   Date of Birth 1960   [facility] [id number]   Medical Record Number  [id number]      Age  61 y.o. PCP Troy Porras NP   Admit date:  9/18/2017    Discharge date: 9/19/2017   Room Number  727/02  @ [facility]   Date of Service  9/19/2017               TYPE OF DISCHARGE: REGULAR               CONDITION AT DISCHARGE: good and stable       PROVISIONAL & DISCHARGE DIAGNOSES:    Problem List  Date Reviewed: 7/2/2014          Codes Class    Factitious disorder ICD-10-CM: F68.10  ICD-9-CM: 300.19         * (Principal)Depression ICD-10-CM: F32.9  ICD-9-CM: 311         Bipolar affective disorder, depressed, severe ([facility] [address] [facility] ICD-10-CM: F31.4  ICD-9-CM: 296.53               Active Hospital Problems    Factitious disorder      *Depression        DISCHARGE DIAGNOSIS:   Axis I:  SEE ABOVE  Axis II: SEE ABOVE  Axis III: SEE ABOVE  Axis IV:  lack of structure  Axis V:  70 on admission, 70 on discharge 70(baseline)       CC & HISTORY OF PRESENT ILLNESS:  61year old female admitted voluntarily for reported very atypical chronic memory problems, history of medication non compliance and marital problems. Pt understood that the treatment team recommended follow up with memory care specialist and supportive psychotherapy. Pt participated activity in providing insurance information and in accepting appropriate out patient referrals. At discharge she was not a danger to self and denied SI/HI intent and plan. SOCIAL HISTORY:    Social History     Social History    Marital status:      Spouse name: N/A    Number of children: N/A    Years of education: N/A     Occupational History    Not on file.      Social History Main Topics    Smoking status: Light Tobacco Smoker    Smokeless tobacco: Former User     Quit date: 1/28/2017    Alcohol use No    Drug use: Yes     Special: Marijuana      Comment: used a week ago    Sexual activity: Yes     Partners: Male     Birth control/ protection: None     Other Topics Concern    Not on file     Social History Narrative    61year old   female admitted because she wanted some supportive psychotherapy for what she reports is very atypical memory loss symptoms , which were not consistent throughout presentation. Pt is upset with  because he doesn't act the way she would like him to with respect to her reported memory loss. Patient has a PCP that prescribes her psychiatric medications. 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 We recommended follow up with a memory care specialist neurologist and supportive psychotherapy. Patient was able to provide insurance information and assist with this disposition. FAMILY HISTORY:   Family History   Problem Relation Age of Onset    Heart Attack Mother     Suicide Brother     COPD Father     Diabetes Father     Hypertension Father              HOSPITALIZATION COURSE:    Shirley Limon was admitted to the inpatient psychiatric unit [facility] for acute psychiatric stabilization in regards to symptomatology as described in the HPI above. The differential diagnosis at time of admission included: memory disorder NOS vs factitious disorder. Hx of depression . While on the unit Shirley Limon was involved in individual, group, occupational and milieu therapy. Psychiatric medications were adjusted during this hospitalization including ambien . Shirley Limon demonstrated a slow, but progressive improvement in overall condition. Much of patient's depression appeared to be related to situational stressors and psychological factors. Please see individual progress notes for more specific details regarding patient's hospitalization course. At time of dc, Shirley Limon was without significant problems with depression psychosis  david.   Overall presentation at time of discharge is most consistent with the diagnosis of adjustment disorder with depressed mood. Patient with request for discharge today. There are no grounds to seek a TDO. Patient has maximized benefit to be derived from acute inpatient psychiatric treatment. All members of the treatment team concur with each other in regards to plans for discharge today per patient's request.          LABS AND IMAGAING:    Labs Reviewed   ACETAMINOPHEN - Abnormal; Notable for the following:        Result Value    Acetaminophen level <2 (*)     All other components within normal limits   CBC WITH AUTOMATED DIFF - Abnormal; Notable for the following:     HGB 8.2 (*)     HCT 28.7 (*)     MCV 74.5 (*)     MCH 21.3 (*)     MCHC 28.6 (*)     RDW 18.8 (*)     PLATELET 684 (*)     All other components within normal limits   METABOLIC PANEL, COMPREHENSIVE - Abnormal; Notable for the following:     Potassium 3.3 (*)     Chloride 112 (*)     Glucose 161 (*)     BUN/Creatinine ratio 22 (*)     Calcium 7.9 (*)     ALT (SGPT) 221 (*)     AST (SGOT) 99 (*)     Alk.  phosphatase 155 (*)     Protein, total 6.2 (*)     Albumin 3.0 (*)     A-G Ratio 0.9 (*)     All other components within normal limits   SALICYLATE - Abnormal; Notable for the following:     SALICYLATE <9.9 (*)     All other components within normal limits   ETHYL ALCOHOL   DRUG SCREEN, URINE   URINALYSIS W/ RFLX MICROSCOPIC   TSH 3RD GENERATION   GLUCOSE, FASTING   LIPID PANEL     Admission on 09/18/2017   Component Date Value Ref Range Status    Acetaminophen level 09/18/2017 <2* 10 - 30 ug/mL Final    ALCOHOL(ETHYL),SERUM 09/18/2017 <10  <10 MG/DL Final    WBC 09/18/2017 6.8  3.6 - 11.0 K/uL Final    RBC 09/18/2017 3.85  3.80 - 5.20 M/uL Final    HGB 09/18/2017 8.2* 11.5 - 16.0 g/dL Final    HCT 09/18/2017 28.7* 35.0 - 47.0 % Final    MCV 09/18/2017 74.5* 80.0 - 99.0 FL Final    MCH 09/18/2017 21.3* 26.0 - 34.0 PG Final    MCHC 09/18/2017 28.6* 30.0 - 36.5 g/dL Final    RDW 09/18/2017 18.8* 11.5 - 14.5 % Final    PLATELET 03/21/0987 881* 150 - 400 K/uL Final    NEUTROPHILS 09/18/2017 56  32 - 75 % Final    LYMPHOCYTES 09/18/2017 33  12 - 49 % Final    MONOCYTES 09/18/2017 10  5 - 13 % Final    EOSINOPHILS 09/18/2017 1  0 - 7 % Final    BASOPHILS 09/18/2017 0  0 - 1 % Final    ABS. NEUTROPHILS 09/18/2017 3.8  1.8 - 8.0 K/UL Final    ABS. LYMPHOCYTES 09/18/2017 2.2  0.8 - 3.5 K/UL Final    ABS. MONOCYTES 09/18/2017 0.7  0.0 - 1.0 K/UL Final    ABS. EOSINOPHILS 09/18/2017 0.1  0.0 - 0.4 K/UL Final    ABS. BASOPHILS 09/18/2017 0.0  0.0 - 0.1 K/UL Final    DF 09/18/2017 SMEAR SCANNED    Final    PLATELET COMMENTS 14/63/3399 LARGE PLATELETS    Final    RBC COMMENTS 09/18/2017     Final                    Value:HYPOCHROMIA  2+      RBC COMMENTS 09/18/2017     Final                    Value:ANISOCYTOSIS  1+      RBC COMMENTS 09/18/2017     Final                    Value:MICROCYTOSIS  PRESENT      Sodium 09/18/2017 142  136 - 145 mmol/L Final    Potassium 09/18/2017 3.3* 3.5 - 5.1 mmol/L Final    Chloride 09/18/2017 112* 97 - 108 mmol/L Final    CO2 09/18/2017 22  21 - 32 mmol/L Final    Anion gap 09/18/2017 8  5 - 15 mmol/L Final    Glucose 09/18/2017 161* 65 - 100 mg/dL Final    BUN 09/18/2017 17  6 - 20 MG/DL Final    Creatinine 09/18/2017 0.79  0.55 - 1.02 MG/DL Final    BUN/Creatinine ratio 09/18/2017 22* 12 - 20   Final    GFR est AA 09/18/2017 >60  >60 ml/min/1.73m2 Final    GFR est non-AA 09/18/2017 >60  >60 ml/min/1.73m2 Final    Calcium 09/18/2017 7.9* 8.5 - 10.1 MG/DL Final    Bilirubin, total 09/18/2017 0.6  0.2 - 1.0 MG/DL Final    ALT (SGPT) 09/18/2017 221* 12 - 78 U/L Final    AST (SGOT) 09/18/2017 99* 15 - 37 U/L Final    Alk.  phosphatase 09/18/2017 155* 45 - 117 U/L Final    Protein, total 09/18/2017 6.2* 6.4 - 8.2 g/dL Final    Albumin 09/18/2017 3.0* 3.5 - 5.0 g/dL Final    Globulin 09/18/2017 3.2  2.0 - 4.0 g/dL Final    A-G Ratio 09/18/2017 0.9* 1.1 - 2.2   Final    AMPHETAMINES 09/18/2017 NEGATIVE   NEG Final    BARBITURATES 09/18/2017 NEGATIVE   NEG   Final    BENZODIAZEPINE 09/18/2017 NEGATIVE   NEG   Final    COCAINE 09/18/2017 NEGATIVE   NEG   Final    METHADONE 09/18/2017 NEGATIVE   NEG   Final    OPIATES 09/18/2017 NEGATIVE   NEG   Final    PCP(PHENCYCLIDINE) 09/18/2017 NEGATIVE   NEG   Final    THC (TH-CANNABINOL) 09/18/2017 NEGATIVE   NEG   Final    Drug screen comment 09/18/2017 (NOTE)   Final    SALICYLATE 40/37/3865 <3.1* 2.8 - 20.0 MG/DL Final    Color 09/18/2017 YELLOW/STRAW    Final    Appearance 09/18/2017 CLEAR  CLEAR   Final    Specific gravity 09/18/2017 1.023  1.003 - 1.030   Final    pH (UA) 09/18/2017 5.5  5.0 - 8.0   Final    Protein 09/18/2017 NEGATIVE   NEG mg/dL Final    Glucose 09/18/2017 NEGATIVE   NEG mg/dL Final    Ketone 09/18/2017 NEGATIVE   NEG mg/dL Final    Bilirubin 09/18/2017 NEGATIVE   NEG   Final    Blood 09/18/2017 NEGATIVE   NEG   Final    Urobilinogen 09/18/2017 1.0  0.2 - 1.0 EU/dL Final    Nitrites 09/18/2017 NEGATIVE   NEG   Final    Leukocyte Esterase 09/18/2017 NEGATIVE   NEG   Final    TSH 09/18/2017 1.89  0.36 - 3.74 uIU/mL Final    Glucose 09/19/2017 83  65 - 100 MG/DL Final    LIPID PROFILE 09/19/2017        Final    Cholesterol, total 09/19/2017 88  <200 MG/DL Final    Triglyceride 09/19/2017 60  <150 MG/DL Final    HDL Cholesterol 09/19/2017 40  MG/DL Final    LDL, calculated 09/19/2017 36  0 - 100 MG/DL Final    VLDL, calculated 09/19/2017 12  MG/DL Final    CHOL/HDL Ratio 09/19/2017 2.2  0 - 5.0   Final     No results found. DISPOSITION:    Home. Patient to f/u with o/p psychiatric, and psychotherapy appointments. Patient is to f/u with internist as directed. FOLLOW-UP CARE:    Activity as tolerated  Regular Diet  Wound Care: none needed.   Follow-up Information     Follow up With Details Comments [address] Schedule an appointment as soon as possible for a visit  [address] [place] [address] [address] [facility]  [phone]      List of therapist given to patient                     PROGNOSIS:  Greatly dependent upon patient's ability to f/u with o/p psychiatric/psychotherapy appointments as well as to comply with psychiatric medications as prescribed. Patient denies suicidal or homicidal ideations. Jami Yang fully contracts for safety. Patient reports many positive predictive factors in terms of not attempting suicide or homicide. Patient appears to be at low risk of suicide or homicide. Patient and family are aware and in agreement with discharge and discharge plan. DISCHARGE MEDICATIONS: (no changes made). Informed consent given for the use of following psychotropic medications:  Current Discharge Medication List      CONTINUE these medications which have NOT CHANGED    Details   asenapine (SAPHRIS) 10 mg subl 10 mg by SubLINGual route every evening. LORazepam (ATIVAN) 1 mg tablet Take 1-2 mg by mouth nightly. prenatal vit-iron fumarate-fa (PRENATAL PLUS WITH IRON) 28 mg iron- 800 mcg tab Take 1 Tab by mouth two (2) times a day. cyanocobalamin (VITAMIN B12) 1,000 mcg/mL injection 1,000 mcg by IntraMUSCular route Every Saturday. vitamin E (AQUA GEMS) 400 unit capsule Take 400 Units by mouth daily. KRILL OIL PO Take 1 Cap by mouth daily. ergocalciferol (ERGOCALCIFEROL) 50,000 unit capsule Take 50,000 Units by mouth Every Friday. IRON PS CPLX/ASCORBIC ACID (IRON PS COMPLEX-ASCORBIC ACID PO) Take 1 Tab by mouth daily.          STOP taking these medications       dextroamphetamine-amphetamine (ADDERALL) 20 mg tablet Comments:   Reason for Stopping:         dextroamphetamine-amphetamine (ADDERALL) 20 mg tablet Comments:   Reason for Stopping:         AMPHETAMINE SALT COMBO 20 mg tablet Comments:   Reason for Stopping:                      A coordinated, multidisplinary treatment team round was conducted with Laura Lang---this is done daily here at [facility] . This team consists of the nurse, psychiatric unit pharmcist,  and writer. I have spent greater than 35 minutes on discharge work.     Signed:  Lydia Mccray MD  9/19/2017

## 2017-09-19 NOTE — BH NOTES
GROUP THERAPY PROGRESS NOTE    Rosa Pickett is participating in [place]. Group time: 15 minutes    Personal goal for participation: \"to talk with my DR. Today\"    Goal orientation: personal    Group therapy participation: active    Therapeutic interventions reviewed and discussed: reviewed  Unit schedule, rules and goals     Impression of participation: Patient was able to attend and participate during entire group.

## 2017-09-19 NOTE — BH NOTES
GROUP THERAPY PROGRESS NOTE    Donna Norman participated in a morning Process Group on the General Unit with a focus identifying feelings,   planning for the day, and learning more about DBT concepts on \"Emotion Regulation. \"    .   Group time: 75 minutes. Personal goal for participation: To increase the capacity to improve ones mood, set personal goals,   and understand more about basic activities to help regulate emotions. Goal orientation: The patients will be able to identify their feelings and develop a plan for   structuring their day. They were also presented with a summary sheet on emotional regulation,   in regards to focusing on one goal per day, taking physical care of oneself, and recognizing   and/or building positive experiences. The didactic portion of the session focused on these three  concepts; 1) defining and focusing on one goal per day; 2) taking care of ones physical   maintenance and basic needs  sleep, nutrition, and exercise; and 3) finding and building on   positive experiences. Group therapy participation: With prompting, this patient participated in the group. Therapeutic interventions reviewed and discussed: The group members were asked to   identify an emotion they are having and/or let the group know what they want to focus on for the   day as they continue to make discharge plans. The group members reviewed three DBT   suggestions regarding emotional regulation, in regards to focusing on one goal per day, taking   physical care of oneself, and recognizing and/or building positive experiences. It was suggested   that these three concepts can be seen as headings for their list of coping skills. The group   members were also provided worksheets on the topic discussed for their review and use on   their own time. Impression of participation: The patient said, \"I don't have anything to say. \" Later in the  Group, she identified with a peer who mentioned struggling with memory loss. She recommended  writing things down as the primary way she concepcion with short-term memory loss. She expressed   no current SI/HI and no overt psychotic symptoms in this group. Her affect was anxious and  depressed and her mood reflected her affect. This was the patient's first group with the undersigned.

## 2017-09-19 NOTE — BH NOTES
Patient admitted voluntarily to General Inpatient Psychiatry, under the services of [facility] N Jinny Reynaga. Patient currently denies suicidal ideation. Patient currently denies homicidal ideation. Patient verbally contracts for safety. Patient denies psychotic symptoms. Pt denies ETOH use. Pt denies drug use. During skin assessment, pt states that she will not allow it. Staff informs pt that staff will not touch her, and if she does not wish to continue, staff will call her doctor, as she is a voluntary patient, and if she does not wish to proceed with the admission process, she can request to be discharged. Pt proceeds to pull up her gown, exposing her body, to female staff, bending over, exposing her buttocks, turning around and exposing her front, stating, 'See this scar\".

## 2017-09-19 NOTE — PROGRESS NOTES
Problem: Falls - Risk of  Goal: *Absence of Falls  Document Heather Fall Risk and appropriate interventions in the flowsheet. Outcome: Progressing Towards Goal  2315 Pt appears asleep in bed. Respirations even and unlabored. Night light on. Will continue to monitor with Q 15 safety checks. 0500 PRN Medication Documentation    Specific patient behavior that led to need for PRN medication: Headache (chronic)  Staff interventions attempted prior to PRN being given: dark quiet environment, ice water  PRN medication given: tylenol   Patient response/effectiveness of PRN medication: 0600 Pt resting quietly in bed. NAD noted.

## 2017-09-19 NOTE — PROGRESS NOTES
Problem: Depressed Mood (Adult/Pediatric)  Goal: *STG: Participates in treatment plan  Outcome: Progressing Towards Goal  Pt expressed desire to meet with care team this AM. Understands why she was admitted. Goal: *STG: Verbalizes anger, guilt, and other feelings in a constructive manor  Does have SI, denies plan. Stated she would never harm herself for the sake of her children. Expressed depressed mood. Goal: *STG: Attends activities and groups  Outcome: Progressing Towards Goal  Declined participation in AM group. Did interact with group at breakfast.   Goal: *STG: Demonstrates reduction in symptoms and increase in insight into coping skills/future focused  Writes in journal. Reflects on her children. Expresses willingness to develop coping skills. Goal: *STG: Remains safe in hospital  Outcome: Progressing Towards Goal  Does have SI, denies plan. Knows to communicate with staff. Goal: *STG: Complies with medication therapy  Outcome: Progressing Towards Goal  Takes medications as directed. Goal: Interventions  Outcome: Progressing Towards Goal  Assess SI. Encourage group participation. Instruct on coping skills. Offer reassurance and support.

## 2017-09-19 NOTE — BH NOTES
Discharge reviewed with patient and she verbalized understanding. Patient discharged at 36 with belongings with  picking her up. Security gave back her valuables that she signed for.

## 2017-09-19 NOTE — INTERDISCIPLINARY ROUNDS
Behavioral Health Interdisciplinary Rounds     Patient Name: Shiraz Weiner  Age: 61 y.o. Room/Bed:  727/02  Primary Diagnosis: <principal problem not specified>   Admission Status: Voluntary     Readmission within 30 days: no  Power of  in place: no  Patient requires a blocked bed: no          Reason for blocked bed: n/a    VTE Prophylaxis: Not indicated  Flu vaccine given : no (ordered)  Mobility needs/Fall risk: no    Nutritional Plan: no  Consults: no         Labs/Testing due today?: yes: fasting glucose, lipid panel - collected      Sleep hours: 5.75      Participation in Care/Groups:  yes  Medication Compliant?: Yes  PRNS (last 24 hours): Sleep Aid, pain   Restraints (last 24 hours):  no  Substance Abuse:  no  CIWA (range last 24 hours):  COWS (range last 24 hours):   Alcohol screening (AUDIT) completed -  AUDIT Score: 0  If applicable, date SBIRT discussed in treatment team AND documented:   Tobacco - patient is a smoker: no   Date tobacco education completed by RN: n/a  24 hour chart check complete: yes     Patient goal(s) for today:   Treatment team focus/goals: Plan for discharge. Progress note {Patient was tearful in treatment team.  \" I cant remember\"  Plan for discharge   LOS:  1  Expected LOS: TBD     Financial concerns/prescription coverage:    Date of last family contact:       Family requesting physician contact today:    Discharge plan: She will return home when ready for discharge.     Guns in the home: no guns at home       Outpatient provider(s): She is followed by her PCP     Participating treatment team members: [address] DENISE

## 2018-01-12 NOTE — H&P
Pre-endoscopy H and P     The patient was seen and examined in the endoscopy suite. The airway was assessed and docuemented. The problem list and medications were reviewed. Patient Active Problem List   Diagnosis Code    Bipolar affective disorder, depressed, severe ([facility] F31.4     Social History     Social History    Marital status:      Spouse name: N/A    Number of children: N/A    Years of education: N/A     Occupational History    Not on file. Social History Main Topics    Smoking status: Light Tobacco Smoker    Smokeless tobacco: Former User     Quit date: 1/28/2017    Alcohol use No    Drug use: Yes     Special: Marijuana      Comment: used a week ago    Sexual activity: Yes     Partners: Male     Birth control/ protection: None     Other Topics Concern    Not on file     Social History Narrative     Past Medical History:   Diagnosis Date    Anemia NEC     Anxiety disorder     Depression     Hypothyroid     Liver disease     past liver failure    [facility]     Other ill-defined conditions     tramatic brain injury    Psychiatric disorder     depression    Stroke ([facility] 1990s    TIA         Prior to Admission Medications   Prescriptions Last Dose Informant Patient Reported? Taking? AMPHETAMINE SALT COMBO 20 mg tablet 5/29/2017 at Unknown time  Yes Yes   Sig: Take 20 mg by mouth two (2) times a day. ergocalciferol (VITAMIN D2) 50,000 unit capsule 5/29/2017 at Unknown time  No Yes   Sig: Take 1 Cap by mouth every seven (7) days. Indications: VITAMIN D DEFICIENCY      Facility-Administered Medications: None       Chief complaint, history of present illness, and review of systems and Past medical History are positive for: abdominal pain, anemia and bipolar disorder. The heart, lungs and mental status were satisfactory for the administration of sedation and for the procedure.      I discussed with the patient the objectives, risks, consequences and alternatives to the procedure.      Plan: Endoscopic procedure with sedation     Barbara Weldon MD   6/29/2017  3:23 PM Name band;

## 2019-06-19 ENCOUNTER — HOSPITAL ENCOUNTER (OUTPATIENT)
Dept: MRI IMAGING | Age: 59
Discharge: HOME | End: 2019-06-19
Payer: MEDICARE

## 2019-06-19 DIAGNOSIS — M47.814 THORACIC SPONDYLOSIS WITHOUT MYELOPATHY: ICD-10-CM

## 2019-06-19 DIAGNOSIS — M51.24 DISPLACEMENT OF THORACIC INTERVERTEBRAL DISC WITHOUT MYELOPATHY: ICD-10-CM

## 2019-06-19 PROCEDURE — 72146 MRI CHEST SPINE W/O DYE: CPT

## 2022-03-19 PROBLEM — F32.A DEPRESSION: Status: ACTIVE | Noted: 2017-09-18

## 2022-03-20 PROBLEM — F68.10 FACTITIOUS DISORDER: Status: ACTIVE | Noted: 2017-09-19

## 2025-08-25 ENCOUNTER — OFFICE VISIT (OUTPATIENT)
Age: 65
End: 2025-08-25
Payer: MEDICARE

## 2025-08-25 VITALS
DIASTOLIC BLOOD PRESSURE: 78 MMHG | SYSTOLIC BLOOD PRESSURE: 104 MMHG | HEIGHT: 70 IN | OXYGEN SATURATION: 95 % | WEIGHT: 203.8 LBS | BODY MASS INDEX: 29.18 KG/M2 | HEART RATE: 92 BPM

## 2025-08-25 DIAGNOSIS — R07.89 OTHER CHEST PAIN: ICD-10-CM

## 2025-08-25 DIAGNOSIS — G47.9 SLEEP DISTURBANCE: ICD-10-CM

## 2025-08-25 DIAGNOSIS — R06.02 SOB (SHORTNESS OF BREATH): Primary | ICD-10-CM

## 2025-08-25 DIAGNOSIS — R53.83 FATIGUE, UNSPECIFIED TYPE: ICD-10-CM

## 2025-08-25 DIAGNOSIS — F17.200 SMOKER: ICD-10-CM

## 2025-08-25 PROCEDURE — 3017F COLORECTAL CA SCREEN DOC REV: CPT | Performed by: SPECIALIST

## 2025-08-25 PROCEDURE — 1090F PRES/ABSN URINE INCON ASSESS: CPT | Performed by: SPECIALIST

## 2025-08-25 PROCEDURE — G8419 CALC BMI OUT NRM PARAM NOF/U: HCPCS | Performed by: SPECIALIST

## 2025-08-25 PROCEDURE — 4004F PT TOBACCO SCREEN RCVD TLK: CPT | Performed by: SPECIALIST

## 2025-08-25 PROCEDURE — 1123F ACP DISCUSS/DSCN MKR DOCD: CPT | Performed by: SPECIALIST

## 2025-08-25 PROCEDURE — G8427 DOCREV CUR MEDS BY ELIG CLIN: HCPCS | Performed by: SPECIALIST

## 2025-08-25 PROCEDURE — 99204 OFFICE O/P NEW MOD 45 MIN: CPT | Performed by: SPECIALIST

## 2025-08-25 PROCEDURE — G8400 PT W/DXA NO RESULTS DOC: HCPCS | Performed by: SPECIALIST

## 2025-08-25 RX ORDER — ERGOCALCIFEROL 1.25 MG/1
1 CAPSULE ORAL
COMMUNITY

## 2025-08-25 RX ORDER — CYANOCOBALAMIN 1000 UG/ML
INJECTION, SOLUTION INTRAMUSCULAR; SUBCUTANEOUS
COMMUNITY

## 2025-08-25 RX ORDER — POTASSIUM CHLORIDE 750 MG/1
10 TABLET, EXTENDED RELEASE ORAL 2 TIMES DAILY
COMMUNITY

## 2025-08-25 ASSESSMENT — PATIENT HEALTH QUESTIONNAIRE - PHQ9
1. LITTLE INTEREST OR PLEASURE IN DOING THINGS: NOT AT ALL
SUM OF ALL RESPONSES TO PHQ QUESTIONS 1-9: 0
SUM OF ALL RESPONSES TO PHQ QUESTIONS 1-9: 0
2. FEELING DOWN, DEPRESSED OR HOPELESS: NOT AT ALL
SUM OF ALL RESPONSES TO PHQ QUESTIONS 1-9: 0
SUM OF ALL RESPONSES TO PHQ QUESTIONS 1-9: 0

## 2025-08-25 ASSESSMENT — LIFESTYLE VARIABLES
HOW OFTEN DO YOU HAVE A DRINK CONTAINING ALCOHOL: NEVER
HOW MANY STANDARD DRINKS CONTAINING ALCOHOL DO YOU HAVE ON A TYPICAL DAY: PATIENT DOES NOT DRINK

## (undated) DEVICE — NEEDLE HYPO 18GA L1.5IN PNK S STL HUB POLYPR SHLD REG BVL

## (undated) DEVICE — BW-412T DISP COMBO CLEANING BRUSH: Brand: SINGLE USE COMBINATION CLEANING BRUSH

## (undated) DEVICE — Z CONVERTED USE 2274299 CUFF BLD PRESSURE LNG MED AD 25-35 CM ARM FLEXIPORT DISP

## (undated) DEVICE — KENDALL RADIOLUCENT FOAM MONITORING ELECTRODE -RECTANGULAR SHAPE: Brand: KENDALL

## (undated) DEVICE — NEONATAL-ADULT SPO2 SENSOR: Brand: NELLCOR

## (undated) DEVICE — ENDO CARRY-ON PROCEDURE KIT INCLUDES ENZYMATIC SPONGE, GAUZE, BIOHAZARD LABEL, TRAY, LUBRICANT, DIRTY SCOPE LABEL, WATER LABEL, TRAY, DRAWSTRING PAD, AND DEFENDO 4-PIECE KIT.: Brand: ENDO CARRY-ON PROCEDURE KIT

## (undated) DEVICE — BAG BELONG PT PERS CLEAR HANDL

## (undated) DEVICE — BITE BLK ENDOSCP AD 54FR GRN POLYETH ENDOSCP W STRP SLD

## (undated) DEVICE — SET EXTN TBNG L BOR 4 W STPCOCK ST 32IN PRIMING VOL 6ML

## (undated) DEVICE — CONTAINER SPEC 20 ML LID NEUT BUFF FORMALIN 10 % POLYPR STS

## (undated) DEVICE — CATH IV AUTOGRD BC BLU 22GA 25 -- INSYTE

## (undated) DEVICE — CANN NASAL O2 CAPNOGRAPHY AD -- FILTERLINE

## (undated) DEVICE — 1200 GUARD II KIT W/5MM TUBE W/O VAC TUBE: Brand: GUARDIAN

## (undated) DEVICE — WRISTBAND ID AD W1XL11.5IN RED POLY ALRG PREPRINTED PERM

## (undated) DEVICE — SOLIDIFIER FLUID 3000 CC ABSORB

## (undated) DEVICE — SET ADMIN 16ML TBNG L100IN 2 Y INJ SITE IV PIGGY BK DISP

## (undated) DEVICE — KIT IV STRT W CHLORAPREP PD 1ML

## (undated) DEVICE — SYRINGE MED 20ML STD CLR PLAS LUERLOCK TIP N CTRL DISP

## (undated) DEVICE — FORCEPS BX L240CM JAW DIA2.8MM L CAP W/ NDL MIC MESH TOOTH

## (undated) DEVICE — BAG SPEC BIOHZD LF 2MIL 6X10IN -- CONVERT TO ITEM 357326